# Patient Record
Sex: MALE | Race: WHITE | NOT HISPANIC OR LATINO | Employment: OTHER | URBAN - METROPOLITAN AREA
[De-identification: names, ages, dates, MRNs, and addresses within clinical notes are randomized per-mention and may not be internally consistent; named-entity substitution may affect disease eponyms.]

---

## 2019-02-18 ENCOUNTER — HISTORICAL (OUTPATIENT)
Dept: RADIOLOGY | Facility: HOSPITAL | Age: 74
End: 2019-02-18

## 2019-05-13 ENCOUNTER — HISTORICAL (OUTPATIENT)
Dept: LAB | Facility: HOSPITAL | Age: 74
End: 2019-05-13

## 2019-05-13 LAB
ABS NEUT (OLG): 4.31 X10(3)/MCL (ref 2.1–9.2)
ALBUMIN SERPL-MCNC: 4 GM/DL (ref 3.4–5)
ALBUMIN/GLOB SERPL: 1.5 {RATIO}
ALP SERPL-CCNC: 91 UNIT/L (ref 50–136)
ALT SERPL-CCNC: 37 UNIT/L (ref 12–78)
AST SERPL-CCNC: 24 UNIT/L (ref 15–37)
BASOPHILS # BLD AUTO: 0 X10(3)/MCL (ref 0–0.2)
BASOPHILS NFR BLD AUTO: 1 %
BILIRUB SERPL-MCNC: 0.6 MG/DL (ref 0.2–1)
BILIRUBIN DIRECT+TOT PNL SERPL-MCNC: 0.2 MG/DL (ref 0–0.2)
BILIRUBIN DIRECT+TOT PNL SERPL-MCNC: 0.4 MG/DL (ref 0–0.8)
BUN SERPL-MCNC: 18 MG/DL (ref 7–18)
CALCIUM SERPL-MCNC: 8.7 MG/DL (ref 8.5–10.1)
CHLORIDE SERPL-SCNC: 107 MMOL/L (ref 98–107)
CHOLEST SERPL-MCNC: 131 MG/DL (ref 0–200)
CHOLEST/HDLC SERPL: 4 {RATIO} (ref 0–5)
CO2 SERPL-SCNC: 28 MMOL/L (ref 21–32)
CREAT SERPL-MCNC: 1.28 MG/DL (ref 0.7–1.3)
EOSINOPHIL # BLD AUTO: 0.2 X10(3)/MCL (ref 0–0.9)
EOSINOPHIL NFR BLD AUTO: 2 %
ERYTHROCYTE [DISTWIDTH] IN BLOOD BY AUTOMATED COUNT: 12.8 % (ref 11.5–17)
EST. AVERAGE GLUCOSE BLD GHB EST-MCNC: 123 MG/DL
GLOBULIN SER-MCNC: 2.7 GM/DL (ref 2.4–3.5)
GLUCOSE SERPL-MCNC: 98 MG/DL (ref 74–106)
HBA1C MFR BLD: 5.9 % (ref 4.2–6.3)
HCT VFR BLD AUTO: 55.7 % (ref 42–52)
HDLC SERPL-MCNC: 33 MG/DL (ref 35–60)
HGB BLD-MCNC: 17.6 GM/DL (ref 14–18)
LDLC SERPL CALC-MCNC: 67 MG/DL (ref 0–129)
LYMPHOCYTES # BLD AUTO: 1.8 X10(3)/MCL (ref 0.6–4.6)
LYMPHOCYTES NFR BLD AUTO: 26 %
MCH RBC QN AUTO: 29.5 PG (ref 27–31)
MCHC RBC AUTO-ENTMCNC: 31.6 GM/DL (ref 33–36)
MCV RBC AUTO: 93.5 FL (ref 80–94)
MONOCYTES # BLD AUTO: 0.6 X10(3)/MCL (ref 0.1–1.3)
MONOCYTES NFR BLD AUTO: 8 %
NEUTROPHILS # BLD AUTO: 4.31 X10(3)/MCL (ref 2.1–9.2)
NEUTROPHILS NFR BLD AUTO: 62 %
PLATELET # BLD AUTO: 188 X10(3)/MCL (ref 130–400)
PMV BLD AUTO: 11.3 FL (ref 9.4–12.4)
POTASSIUM SERPL-SCNC: 4.5 MMOL/L (ref 3.5–5.1)
PROT SERPL-MCNC: 6.7 GM/DL (ref 6.4–8.2)
PSA SERPL-MCNC: 1.25 NG/ML (ref 0–4)
RBC # BLD AUTO: 5.96 X10(6)/MCL (ref 4.7–6.1)
SODIUM SERPL-SCNC: 140 MMOL/L (ref 136–145)
TRIGL SERPL-MCNC: 154 MG/DL (ref 30–150)
VIT B12 SERPL-MCNC: 502 PG/ML (ref 193–986)
VLDLC SERPL CALC-MCNC: 31 MG/DL
WBC # SPEC AUTO: 6.9 X10(3)/MCL (ref 4.5–11.5)

## 2020-03-03 ENCOUNTER — HISTORICAL (OUTPATIENT)
Dept: ADMINISTRATIVE | Facility: HOSPITAL | Age: 75
End: 2020-03-03

## 2022-03-23 ENCOUNTER — HISTORICAL (OUTPATIENT)
Dept: ENDOSCOPY | Facility: HOSPITAL | Age: 77
End: 2022-03-23

## 2022-04-10 ENCOUNTER — HISTORICAL (OUTPATIENT)
Dept: ADMINISTRATIVE | Facility: HOSPITAL | Age: 77
End: 2022-04-10
Payer: MEDICARE

## 2022-04-11 ENCOUNTER — HISTORICAL (OUTPATIENT)
Dept: ADMINISTRATIVE | Facility: HOSPITAL | Age: 77
End: 2022-04-11
Payer: MEDICARE

## 2022-04-26 ENCOUNTER — HISTORICAL (OUTPATIENT)
Dept: ADMINISTRATIVE | Facility: HOSPITAL | Age: 77
End: 2022-04-26
Payer: MEDICARE

## 2022-04-28 VITALS
DIASTOLIC BLOOD PRESSURE: 76 MMHG | OXYGEN SATURATION: 97 % | WEIGHT: 204.5 LBS | BODY MASS INDEX: 32.87 KG/M2 | HEIGHT: 66 IN | SYSTOLIC BLOOD PRESSURE: 130 MMHG

## 2022-04-28 VITALS
DIASTOLIC BLOOD PRESSURE: 76 MMHG | BODY MASS INDEX: 32.87 KG/M2 | SYSTOLIC BLOOD PRESSURE: 130 MMHG | WEIGHT: 204.5 LBS | HEIGHT: 66 IN | OXYGEN SATURATION: 97 %

## 2022-05-14 NOTE — H&P
Patient:   Landen Reyes             MRN: [id number]            FIN: [id number]               Age:   77 years     Sex:  Male     :  1945   Associated Diagnoses:   None   Author:   Raheem Estrada MD      CC: Melena    Subjective: 77 year old with history of CAD, gastric ulcer here for endorsement of melena.  Recently seen in clinic with these symptoms, although no issues since that time.  He has no other additional complaints.     Past Medical History:  CAD    Review of Systems:  as above    Physical Exam:  General appearance: alert, cooperative, no distress  HENT: Normocephalic, atraumatic, neck symmetrical, no nasal discharge   Lungs: clear to auscultation bilaterally, symmetric chest wall expansion bilaterally  Heart: regular rate and rhythm without rub; no displacement of the PMI   Abdomen: soft  Extremities: extremities symmetric; no clubbing, cyanosis, or edema  Neurologic: Alert and oriented X 3, normal strength, normal coordination and gait      Assessment:  Melena    Plan:  EGD

## 2022-05-16 ENCOUNTER — OFFICE VISIT (OUTPATIENT)
Dept: URGENT CARE | Facility: CLINIC | Age: 77
End: 2022-05-16
Payer: MEDICARE

## 2022-05-16 VITALS
BODY MASS INDEX: 33.11 KG/M2 | HEIGHT: 66 IN | OXYGEN SATURATION: 98 % | RESPIRATION RATE: 16 BRPM | SYSTOLIC BLOOD PRESSURE: 139 MMHG | HEART RATE: 73 BPM | TEMPERATURE: 98 F | DIASTOLIC BLOOD PRESSURE: 84 MMHG | WEIGHT: 206 LBS

## 2022-05-16 DIAGNOSIS — S61.219A FINGER LACERATION, INITIAL ENCOUNTER: Primary | ICD-10-CM

## 2022-05-16 PROCEDURE — 99213 OFFICE O/P EST LOW 20 MIN: CPT | Mod: ,,, | Performed by: FAMILY MEDICINE

## 2022-05-16 PROCEDURE — 99213 PR OFFICE/OUTPT VISIT, EST, LEVL III, 20-29 MIN: ICD-10-PCS | Mod: ,,, | Performed by: FAMILY MEDICINE

## 2022-05-16 RX ORDER — SERTRALINE HYDROCHLORIDE 25 MG/1
25 TABLET, FILM COATED ORAL DAILY
COMMUNITY
Start: 2022-05-16

## 2022-05-16 RX ORDER — TAMSULOSIN HYDROCHLORIDE 0.4 MG/1
1 CAPSULE ORAL DAILY
COMMUNITY
Start: 2022-05-09

## 2022-05-16 RX ORDER — NITROGLYCERIN 0.4 MG/1
0.4 TABLET SUBLINGUAL
COMMUNITY
Start: 2022-03-17

## 2022-05-16 RX ORDER — ATENOLOL 50 MG/1
25 TABLET ORAL 2 TIMES DAILY
COMMUNITY
Start: 2022-05-02 | End: 2023-10-17 | Stop reason: SDUPTHER

## 2022-05-16 RX ORDER — ROSUVASTATIN CALCIUM 40 MG/1
40 TABLET, COATED ORAL NIGHTLY
COMMUNITY
Start: 2022-03-30 | End: 2023-10-17

## 2022-05-16 RX ORDER — AMLODIPINE BESYLATE 5 MG/1
5 TABLET ORAL DAILY
COMMUNITY
Start: 2022-03-05 | End: 2023-10-17 | Stop reason: SDUPTHER

## 2022-05-16 RX ORDER — DIAZEPAM 10 MG/1
20 TABLET ORAL NIGHTLY
COMMUNITY
Start: 2022-03-14

## 2022-05-16 RX ORDER — FINASTERIDE 5 MG/1
5 TABLET, FILM COATED ORAL DAILY
COMMUNITY
Start: 2022-03-17

## 2022-05-16 RX ORDER — DOCUSATE SODIUM 100 MG/1
100 CAPSULE, LIQUID FILLED ORAL DAILY
COMMUNITY
Start: 2022-03-09

## 2022-05-16 RX ORDER — ZOLPIDEM TARTRATE 10 MG/1
1 TABLET ORAL
COMMUNITY
Start: 2022-04-07

## 2022-05-16 RX ORDER — BUSPIRONE HYDROCHLORIDE 15 MG/1
15 TABLET ORAL 2 TIMES DAILY
COMMUNITY
Start: 2021-11-23 | End: 2023-10-17

## 2022-05-16 NOTE — PROGRESS NOTES
"Subjective:       Patient ID: Landen Reyes is a 77 y.o. male.    Vitals:  height is 5' 6" (1.676 m) and weight is 93.4 kg (206 lb). His temperature is 97.9 °F (36.6 °C). His blood pressure is 139/84 and his pulse is 73. His respiration is 16 and oxygen saturation is 98%.     Chief Complaint: Laceration (Pt presents to clinic with laceration on the left index finger. Pt states he was opening dog's food with mauricio opener and cut the finger. Incident happened today.)    Laceration   The incident occurred 6 to 12 hours ago. The laceration is located on the left hand. The patient is experiencing no pain.       Constitution: Negative for appetite change, chills and sweating.   Respiratory: Negative for chest tightness and cough.    Skin: Positive for laceration. Negative for abscess.   Neurological: Negative for altered mental status.   Psychiatric/Behavioral: Negative for altered mental status.       Objective:      Physical Exam   Cardiovascular: Normal rate.   Abdominal: Normal appearance.   Neurological: He is alert.   Skin: Skin is warm.         Comments: L 3rd digit with superficial laceration of the palmar surface of the distal end, bleeding stopped         Assessment:       1. Finger laceration, initial encounter          Plan:         Finger laceration, initial encounter            Dermabond placed today and wrapped.       "

## 2022-06-02 ENCOUNTER — HOSPITAL ENCOUNTER (OUTPATIENT)
Facility: HOSPITAL | Age: 77
Discharge: HOME | End: 2022-06-02
Attending: INTERNAL MEDICINE | Admitting: INTERNAL MEDICINE
Payer: MEDICARE

## 2022-06-02 VITALS
DIASTOLIC BLOOD PRESSURE: 76 MMHG | SYSTOLIC BLOOD PRESSURE: 146 MMHG | TEMPERATURE: 98 F | OXYGEN SATURATION: 94 % | BODY MASS INDEX: 33.8 KG/M2 | RESPIRATION RATE: 20 BRPM | WEIGHT: 210.31 LBS | HEART RATE: 63 BPM | HEIGHT: 66 IN

## 2022-06-02 DIAGNOSIS — I25.10 CAD (CORONARY ARTERY DISEASE): ICD-10-CM

## 2022-06-02 DIAGNOSIS — Z98.61 CAD S/P PERCUTANEOUS CORONARY ANGIOPLASTY: ICD-10-CM

## 2022-06-02 DIAGNOSIS — I25.10 CORONARY ATHEROSCLEROSIS OF NATIVE CORONARY ARTERY: ICD-10-CM

## 2022-06-02 DIAGNOSIS — I25.10 CAD S/P PERCUTANEOUS CORONARY ANGIOPLASTY: ICD-10-CM

## 2022-06-02 LAB — CATH EF QUANTITATIVE: 65 %

## 2022-06-02 PROCEDURE — 93458 L HRT ARTERY/VENTRICLE ANGIO: CPT | Mod: 59 | Performed by: INTERNAL MEDICINE

## 2022-06-02 PROCEDURE — C1887 CATHETER, GUIDING: HCPCS | Performed by: INTERNAL MEDICINE

## 2022-06-02 PROCEDURE — 93005 ELECTROCARDIOGRAM TRACING: CPT | Mod: 59

## 2022-06-02 PROCEDURE — 92978 ENDOLUMINL IVUS OCT C 1ST: CPT | Performed by: INTERNAL MEDICINE

## 2022-06-02 PROCEDURE — 25000003 PHARM REV CODE 250: Performed by: INTERNAL MEDICINE

## 2022-06-02 PROCEDURE — C1753 CATH, INTRAVAS ULTRASOUND: HCPCS | Performed by: INTERNAL MEDICINE

## 2022-06-02 PROCEDURE — C9602 PERC D-E COR STENT ATHER S: HCPCS | Mod: LC | Performed by: INTERNAL MEDICINE

## 2022-06-02 PROCEDURE — C1874 STENT, COATED/COV W/DEL SYS: HCPCS | Performed by: INTERNAL MEDICINE

## 2022-06-02 PROCEDURE — 25500020 PHARM REV CODE 255: Performed by: INTERNAL MEDICINE

## 2022-06-02 PROCEDURE — 99153 MOD SED SAME PHYS/QHP EA: CPT | Performed by: INTERNAL MEDICINE

## 2022-06-02 PROCEDURE — 27201423 OPTIME MED/SURG SUP & DEVICES STERILE SUPPLY: Performed by: INTERNAL MEDICINE

## 2022-06-02 PROCEDURE — 99152 MOD SED SAME PHYS/QHP 5/>YRS: CPT | Performed by: INTERNAL MEDICINE

## 2022-06-02 PROCEDURE — C1769 GUIDE WIRE: HCPCS | Performed by: INTERNAL MEDICINE

## 2022-06-02 PROCEDURE — 63600175 PHARM REV CODE 636 W HCPCS: Performed by: INTERNAL MEDICINE

## 2022-06-02 PROCEDURE — C1894 INTRO/SHEATH, NON-LASER: HCPCS | Performed by: INTERNAL MEDICINE

## 2022-06-02 PROCEDURE — C1725 CATH, TRANSLUMIN NON-LASER: HCPCS | Performed by: INTERNAL MEDICINE

## 2022-06-02 DEVICE — STENT RONYX25022UX RESOLUTE ONYX 2.50X22
Type: IMPLANTABLE DEVICE | Site: HEART | Status: FUNCTIONAL
Brand: RESOLUTE ONYX™

## 2022-06-02 RX ORDER — MAG HYDROX/ALUMINUM HYD/SIMETH 200-200-20
SUSPENSION, ORAL (FINAL DOSE FORM) ORAL
Status: DISCONTINUED | OUTPATIENT
Start: 2022-06-02 | End: 2022-06-02 | Stop reason: HOSPADM

## 2022-06-02 RX ORDER — DULOXETIN HYDROCHLORIDE 30 MG/1
30 CAPSULE, DELAYED RELEASE ORAL 2 TIMES DAILY
COMMUNITY

## 2022-06-02 RX ORDER — VERAPAMIL HYDROCHLORIDE 2.5 MG/ML
INJECTION, SOLUTION INTRAVENOUS
Status: DISCONTINUED | OUTPATIENT
Start: 2022-06-02 | End: 2022-06-02 | Stop reason: HOSPADM

## 2022-06-02 RX ORDER — DIPHENHYDRAMINE HYDROCHLORIDE 50 MG/ML
INJECTION INTRAMUSCULAR; INTRAVENOUS
Status: DISCONTINUED | OUTPATIENT
Start: 2022-06-02 | End: 2022-06-02 | Stop reason: HOSPADM

## 2022-06-02 RX ORDER — HEPARIN SODIUM 1000 [USP'U]/ML
INJECTION, SOLUTION INTRAVENOUS; SUBCUTANEOUS
Status: DISCONTINUED | OUTPATIENT
Start: 2022-06-02 | End: 2022-06-02 | Stop reason: HOSPADM

## 2022-06-02 RX ORDER — MIDAZOLAM HYDROCHLORIDE 1 MG/ML
INJECTION, SOLUTION INTRAMUSCULAR; INTRAVENOUS
Status: DISCONTINUED | OUTPATIENT
Start: 2022-06-02 | End: 2022-06-02 | Stop reason: HOSPADM

## 2022-06-02 RX ORDER — FENTANYL CITRATE 50 UG/ML
INJECTION, SOLUTION INTRAMUSCULAR; INTRAVENOUS
Status: DISCONTINUED | OUTPATIENT
Start: 2022-06-02 | End: 2022-06-02 | Stop reason: HOSPADM

## 2022-06-02 RX ORDER — ASPIRIN 81 MG/1
81 TABLET ORAL DAILY
COMMUNITY

## 2022-06-02 RX ORDER — PILOCARPINE HYDROCHLORIDE 10 MG/ML
1 SOLUTION/ DROPS OPHTHALMIC 2 TIMES DAILY
COMMUNITY
Start: 2022-05-25

## 2022-06-02 RX ORDER — SODIUM CHLORIDE 9 MG/ML
INJECTION, SOLUTION INTRAVENOUS CONTINUOUS
Status: DISCONTINUED | OUTPATIENT
Start: 2022-06-02 | End: 2022-06-02 | Stop reason: HOSPADM

## 2022-06-02 RX ORDER — SODIUM CHLORIDE 0.9 % (FLUSH) 0.9 %
10 SYRINGE (ML) INJECTION
Status: ACTIVE | OUTPATIENT
Start: 2022-06-02

## 2022-06-02 RX ORDER — TRAVOPROST OPHTHALMIC SOLUTION 0.04 MG/ML
1 SOLUTION OPHTHALMIC NIGHTLY
COMMUNITY
Start: 2022-05-25

## 2022-06-02 RX ORDER — CLOPIDOGREL 300 MG/1
TABLET, FILM COATED ORAL
Status: DISCONTINUED | OUTPATIENT
Start: 2022-06-02 | End: 2022-06-02 | Stop reason: HOSPADM

## 2022-06-02 RX ORDER — SODIUM CHLORIDE 9 MG/ML
INJECTION, SOLUTION INTRAVENOUS ONCE
Status: COMPLETED | OUTPATIENT
Start: 2022-06-02 | End: 2022-06-02

## 2022-06-02 RX ORDER — DIAZEPAM 5 MG/1
10 TABLET ORAL
Status: COMPLETED | OUTPATIENT
Start: 2022-06-02 | End: 2022-06-02

## 2022-06-02 RX ORDER — DIPHENHYDRAMINE HCL 25 MG
50 CAPSULE ORAL
Status: COMPLETED | OUTPATIENT
Start: 2022-06-02 | End: 2022-06-02

## 2022-06-02 RX ORDER — LIDOCAINE HYDROCHLORIDE 20 MG/ML
INJECTION, SOLUTION INFILTRATION; PERINEURAL
Status: DISCONTINUED | OUTPATIENT
Start: 2022-06-02 | End: 2022-06-02 | Stop reason: HOSPADM

## 2022-06-02 RX ORDER — CLOPIDOGREL BISULFATE 75 MG/1
75 TABLET ORAL DAILY
Qty: 30 TABLET | Refills: 11 | Status: SHIPPED | OUTPATIENT
Start: 2022-06-02 | End: 2023-10-17 | Stop reason: ALTCHOICE

## 2022-06-02 RX ADMIN — DIPHENHYDRAMINE HYDROCHLORIDE 50 MG: 25 CAPSULE ORAL at 06:06

## 2022-06-02 RX ADMIN — DIAZEPAM 10 MG: 5 TABLET ORAL at 06:06

## 2022-06-02 RX ADMIN — SODIUM CHLORIDE: 9 INJECTION, SOLUTION INTRAVENOUS at 06:06

## 2022-06-02 NOTE — DISCHARGE INSTRUCTIONS
Informed to keep Rt armboard on x 24 hrs then remove. Information sheet given specific to Radial artery puncture sites - do not lift more than 5 lbs x 5 days.

## 2022-06-02 NOTE — Clinical Note
The catheter was inserted into the, was removed from the and was inserted over the wire into the ostial  left coronary artery. Hemodynamics were performed.  An angiography was performed of the left coronary arteries. Multiple views were taken.

## 2022-06-02 NOTE — DISCHARGE SUMMARY
[facility] - Cath Lab Services  Discharge Note  Short Stay    Procedure(s) (LRB):  CATHETERIZATION, HEART, LEFT (Left)    OUTCOME: Patient tolerated treatment/procedure well without complication and is now ready for discharge.    DISPOSITION: Home or Self Care    FINAL DIAGNOSIS:  <principal problem not specified>    FOLLOWUP: In clinic    DISCHARGE INSTRUCTIONS:  No discharge procedures on file.     TIME SPENT ON DISCHARGE: 120 minutes

## 2022-06-02 NOTE — Clinical Note
The catheter was inserted into the, was removed from the and was inserted over the wire into the left coronary artery. Hemodynamics were performed.  An angiography was performed of the left coronary arteries. Multiple views were taken.

## 2022-06-02 NOTE — Clinical Note
The catheter was inserted into the, was removed from the and was inserted over the wire into the ostium   right coronary artery. Hemodynamics were performed.  An angiography was performed Multiple views were taken.

## 2022-06-02 NOTE — INTERVAL H&P NOTE
Patient name: Landen Reyes  MRN: [id number]  : 1945  Cath Lab Procedure H&P Update    Pre-Procedure Assessment:    I saw and examined the patient face to face. The patient has been re-evaluated and his condition is unchanged. The reason for admission, procedure and care is still present.  Based on the patients H&P, pre-procedure physical exam, relevant diagnostic studies, NPO status and information obtained from the patient, I determine the patient is an appropriate candidate for the proposed procedure and anesthesia planned. I further certify the anesthesia risks, benefits and options have been explained to the patient to which he agrees as documented on the procedural consent.

## 2022-06-02 NOTE — Clinical Note
The catheter was inserted into the, was removed from the and was inserted over the wire into the left ventricle. Hemodynamics were performed.  and Pullback was recorded.  EF-60%

## 2022-06-02 NOTE — Clinical Note
The site was marked. The groin and right radial was prepped. The site was prepped with ChloraPrep. The site was clipped. The patient was draped.

## 2022-06-02 NOTE — Clinical Note
The catheter was inserted into the, was removed from the, was secured in place in the and was inserted over the wire into the ostial  left coronary artery. Hemodynamics were performed.  An angiography was performed of the left coronary arteries. Multiple views were taken. The angiography was performed via power injection.

## 2023-05-29 ENCOUNTER — HOSPITAL ENCOUNTER (EMERGENCY)
Facility: HOSPITAL | Age: 78
Discharge: HOME | End: 2023-05-29
Attending: STUDENT IN AN ORGANIZED HEALTH CARE EDUCATION/TRAINING PROGRAM
Payer: MEDICARE

## 2023-05-29 VITALS
HEIGHT: 66 IN | DIASTOLIC BLOOD PRESSURE: 81 MMHG | OXYGEN SATURATION: 97 % | HEART RATE: 59 BPM | BODY MASS INDEX: 34.12 KG/M2 | WEIGHT: 212.31 LBS | SYSTOLIC BLOOD PRESSURE: 153 MMHG | RESPIRATION RATE: 23 BRPM | TEMPERATURE: 97 F

## 2023-05-29 DIAGNOSIS — K59.00 CONSTIPATION: ICD-10-CM

## 2023-05-29 DIAGNOSIS — K62.5 RECTAL BLEEDING: Primary | ICD-10-CM

## 2023-05-29 LAB
ALBUMIN SERPL-MCNC: 4 G/DL (ref 3.4–4.8)
ALBUMIN/GLOB SERPL: 1.3 RATIO (ref 1.1–2)
ALP SERPL-CCNC: 96 UNIT/L (ref 40–150)
ALT SERPL-CCNC: 25 UNIT/L (ref 0–55)
AST SERPL-CCNC: 22 UNIT/L (ref 5–34)
BASOPHILS # BLD AUTO: 0.04 X10(3)/MCL
BASOPHILS NFR BLD AUTO: 0.7 %
BILIRUBIN DIRECT+TOT PNL SERPL-MCNC: 0.4 MG/DL
BUN SERPL-MCNC: 23.2 MG/DL (ref 8.4–25.7)
CALCIUM SERPL-MCNC: 9.7 MG/DL (ref 8.8–10)
CHLORIDE SERPL-SCNC: 108 MMOL/L (ref 98–107)
CO2 SERPL-SCNC: 25 MMOL/L (ref 23–31)
CREAT SERPL-MCNC: 1.19 MG/DL (ref 0.73–1.18)
EOSINOPHIL # BLD AUTO: 0.25 X10(3)/MCL (ref 0–0.9)
EOSINOPHIL NFR BLD AUTO: 4.3 %
ERYTHROCYTE [DISTWIDTH] IN BLOOD BY AUTOMATED COUNT: 12.5 % (ref 11.5–17)
GFR SERPLBLD CREATININE-BSD FMLA CKD-EPI: >60 MLS/MIN/1.73/M2
GLOBULIN SER-MCNC: 3.2 GM/DL (ref 2.4–3.5)
GLUCOSE SERPL-MCNC: 97 MG/DL (ref 82–115)
GROUP & RH: NORMAL
HCT VFR BLD AUTO: 49.1 % (ref 42–52)
HGB BLD-MCNC: 16.4 G/DL (ref 14–18)
IMM GRANULOCYTES # BLD AUTO: 0.03 X10(3)/MCL (ref 0–0.04)
IMM GRANULOCYTES NFR BLD AUTO: 0.5 %
INDIRECT COOMBS GEL: NORMAL
LYMPHOCYTES # BLD AUTO: 1.62 X10(3)/MCL (ref 0.6–4.6)
LYMPHOCYTES NFR BLD AUTO: 27.6 %
MCH RBC QN AUTO: 30 PG (ref 27–31)
MCHC RBC AUTO-ENTMCNC: 33.4 G/DL (ref 33–36)
MCV RBC AUTO: 89.9 FL (ref 80–94)
MONOCYTES # BLD AUTO: 0.58 X10(3)/MCL (ref 0.1–1.3)
MONOCYTES NFR BLD AUTO: 9.9 %
NEUTROPHILS # BLD AUTO: 3.35 X10(3)/MCL (ref 2.1–9.2)
NEUTROPHILS NFR BLD AUTO: 57 %
NRBC BLD AUTO-RTO: 0 %
PLATELET # BLD AUTO: 237 X10(3)/MCL (ref 130–400)
PMV BLD AUTO: 10.5 FL (ref 7.4–10.4)
POTASSIUM SERPL-SCNC: 4.6 MMOL/L (ref 3.5–5.1)
PROT SERPL-MCNC: 7.2 GM/DL (ref 5.8–7.6)
RBC # BLD AUTO: 5.46 X10(6)/MCL (ref 4.7–6.1)
SODIUM SERPL-SCNC: 139 MMOL/L (ref 136–145)
SPECIMEN OUTDATE: NORMAL
WBC # SPEC AUTO: 5.87 X10(3)/MCL (ref 4.5–11.5)

## 2023-05-29 PROCEDURE — 80053 COMPREHEN METABOLIC PANEL: CPT | Performed by: STUDENT IN AN ORGANIZED HEALTH CARE EDUCATION/TRAINING PROGRAM

## 2023-05-29 PROCEDURE — 99284 EMERGENCY DEPT VISIT MOD MDM: CPT

## 2023-05-29 PROCEDURE — 86900 BLOOD TYPING SEROLOGIC ABO: CPT | Performed by: STUDENT IN AN ORGANIZED HEALTH CARE EDUCATION/TRAINING PROGRAM

## 2023-05-29 PROCEDURE — 85610 PROTHROMBIN TIME: CPT | Performed by: STUDENT IN AN ORGANIZED HEALTH CARE EDUCATION/TRAINING PROGRAM

## 2023-05-29 PROCEDURE — 85025 COMPLETE CBC W/AUTO DIFF WBC: CPT | Performed by: STUDENT IN AN ORGANIZED HEALTH CARE EDUCATION/TRAINING PROGRAM

## 2023-05-29 RX ORDER — POLYETHYLENE GLYCOL 3350 17 G/17G
17 POWDER, FOR SOLUTION ORAL 2 TIMES DAILY
Qty: 30 EACH | Refills: 0 | Status: SHIPPED | OUTPATIENT
Start: 2023-05-29 | End: 2023-08-10

## 2023-05-29 NOTE — ED PROVIDER NOTES
Encounter Date: 5/29/2023       History     Chief Complaint   Patient presents with    Rectal Bleeding     Blood in stool this am.  No abd pain at this time.       78-year-old male presents to ED for constipation and rectal bleeding.  States he has had constipation for months.  Reports he has to strain to stool.  Last full bowel movement was 4 days ago but since he is had smaller bowel movements.  Denies any abdominal pain or distention.  States earlier today he did not have a bowel movement however when he wiped there was a small amount of blood.  No significant history of rectal bleeding.  Takes Plavix compliantly.  No rectal bleeding since.  No nausea or vomiting, no other complaints or concerns at this time.  Adult male in the room with patient.    Review of patient's allergies indicates:   Allergen Reactions    Rosuvastatin Other (See Comments)     myalagias    Sulfur Other (See Comments)     Headaches     Past Medical History:   Diagnosis Date    Angina pectoris     Anxiety disorder, unspecified     Blockage of coronary artery of heart     Depression     Hyperlipidemia     Hypertension     Unspecified glaucoma      Past Surgical History:   Procedure Laterality Date    COLONOSCOPY W/ POLYPECTOMY      heart stents      Circumflex, LAD, RCA    LEFT HEART CATHETERIZATION Left 6/2/2022    Procedure: CATHETERIZATION, HEART, LEFT;  Surgeon: Paulie Martin MD;  Location: [facility] CATH LAB;  Service: Cardiology;  Laterality: Left;  [facility] VIA RADIAL ACCESS     Family History   Family history unknown: Yes     Social History     Tobacco Use    Smoking status: Former    Smokeless tobacco: Former   Substance Use Topics    Alcohol use: Never     Comment: ocassionally    Drug use: Never     Review of Systems   Constitutional:  Negative for chills and fever.   HENT:  Negative for congestion, rhinorrhea and sore throat.    Eyes:  Negative for pain, discharge and itching.   Respiratory:  Negative for chest  tightness and shortness of breath.    Cardiovascular:  Negative for chest pain and palpitations.   Gastrointestinal:  Positive for blood in stool and constipation. Negative for abdominal distention, abdominal pain, anal bleeding, diarrhea, nausea, rectal pain and vomiting.   Genitourinary:  Negative for dysuria and hematuria.   Musculoskeletal:  Negative for myalgias and neck pain.   Skin:  Negative for color change and rash.   Neurological:  Negative for dizziness, weakness and headaches.   Psychiatric/Behavioral:  Negative for confusion. The patient is not hyperactive.      Physical Exam     Initial Vitals [05/29/23 1158]   BP Pulse Resp Temp SpO2   (!) 172/71 72 18 97.4 °F (36.3 °C) 97 %      MAP       --         Physical Exam    Constitutional: He appears well-developed and well-nourished. He is not diaphoretic. No distress.   HENT:   Head: Normocephalic and atraumatic.   Eyes: Conjunctivae and EOM are normal. Pupils are equal, round, and reactive to light.   Neck: Neck supple. No tracheal deviation present.   Normal range of motion.  Cardiovascular:  Normal rate, regular rhythm and normal heart sounds.           Pulmonary/Chest: Breath sounds normal. No respiratory distress.   Abdominal: Abdomen is soft. There is no abdominal tenderness. There is no rebound.   Genitourinary:    Genitourinary Comments: External rectal exam unremarkable.  No active bleeding or hemorrhoids.  Surrounding tissue appears mildly irritated without fissure.  Nonpainful.     Musculoskeletal:         General: No tenderness. Normal range of motion.      Cervical back: Normal range of motion and neck supple.     Neurological: He is alert and oriented to person, place, and time. He has normal strength. GCS score is 15. GCS eye subscore is 4. GCS verbal subscore is 5. GCS motor subscore is 6.   Skin: Skin is warm and dry. Capillary refill takes less than 2 seconds. No rash noted.   Psychiatric: He has a normal mood and affect. His behavior is  normal. Judgment and thought content normal.       ED Course   Procedures  Labs Reviewed   COMPREHENSIVE METABOLIC PANEL - Abnormal; Notable for the following components:       Result Value    Chloride 108 (*)     Creatinine 1.19 (*)     All other components within normal limits   CBC WITH DIFFERENTIAL - Abnormal; Notable for the following components:    MPV 10.5 (*)     All other components within normal limits   CBC W/ AUTO DIFFERENTIAL    Narrative:     The following orders were created for panel order CBC auto differential.  Procedure                               Abnormality         Status                     ---------                               -----------         ------                     CBC with Differential[[id number]]        Abnormal            Final result                 Please view results for these tests on the individual orders.   PROTIME-INR   EXTRA TUBES    Narrative:     The following orders were created for panel order EXTRA TUBES.  Procedure                               Abnormality         Status                     ---------                               -----------         ------                     Gold Top Hold[[id number]]                                    In process                   Please view results for these tests on the individual orders.   GOLD TOP HOLD   TYPE & SCREEN          Imaging Results              X-Ray Abdomen Flat And Erect (Final result)  Result time 05/29/23 13:46:59      Final result by Ced Clemons MD (05/29/23 13:46:59)                   Impression:      Nonobstructive bowel gas pattern.  Stool scattered throughout the colon as may be seen with constipation.      Electronically signed by: Ced Clemons  Date:    05/29/2023  Time:    13:46               Narrative:    EXAMINATION:  XR ABDOMEN FLAT AND ERECT    CLINICAL HISTORY:  Constipation, unspecified    TECHNIQUE:  Flat and upright imaging of the abdomen    COMPARISON:  None    FINDINGS:  No acute osseous  abnormality.  Nonobstructive bowel gas pattern.  Stool scattered throughout the colon.                                       Medications - No data to display  Medical Decision Making:   History:   Old Medical Records: I decided to obtain old medical records.  Initial Assessment:   78-year-old male presents to ED for worsening constipation and streak of blood in stool  Differential Diagnosis:   GI bleed  Hemorrhoids  Constipation  Diverticulosis  Diverticulitis  IBD  Gastritis  Gastric versus duodenal ulcers  Clinical Tests:   Lab Tests: Reviewed and Ordered  Radiological Study: Ordered and Reviewed  ED Management:  Patient in department hemodynamically stable.  Not tachycardic, hypotensive or uncomfortable suggestive of internal bleeding.  Rectal exam grossly benign.  Exam nonpainful, noninfectious, no obvious hemorrhoids.  X-ray demonstrates stool consistent with constipation and labs show normal hemoglobin and hematocrit, no evidence of infection, etc.  Will prescribe constipation medication and given dietary recommendations.  Is to follow up closely in the outpatient setting.  No evidence of gross rectal or GI bleed.  Very strict return precautions provided.  Ultimately stable for discharge and released. (Ana)                        Clinical Impression:   Final diagnoses:  [K59.00] Constipation  [K62.5] Rectal bleeding (Primary)        ED Disposition Condition    Discharge Stable          ED Prescriptions       Medication Sig Dispense Start Date End Date Auth. Provider    polyethylene glycol (GLYCOLAX) 17 gram PwPk Take 17 g by mouth 2 (two) times daily. 30 each 5/29/2023 -- Krishna Perez MD          Follow-up Information       Follow up With Specialties Details Why Contact Info    Mitchel Espana MD Family Medicine Schedule an appointment as soon as possible for a visit in 3 days  [address]  [facility] [address]  [phone]      [facility] Dept Emergency Medicine  As  needed, If symptoms worsen [address] [facility] [address]  [phone]             Krishna Perez MD  06/08/23 0356

## 2023-08-10 ENCOUNTER — OFFICE VISIT (OUTPATIENT)
Dept: GASTROENTEROLOGY | Facility: CLINIC | Age: 78
End: 2023-08-10
Payer: MEDICARE

## 2023-08-10 VITALS
WEIGHT: 218 LBS | SYSTOLIC BLOOD PRESSURE: 122 MMHG | BODY MASS INDEX: 35.03 KG/M2 | OXYGEN SATURATION: 95 % | HEART RATE: 63 BPM | DIASTOLIC BLOOD PRESSURE: 74 MMHG | HEIGHT: 66 IN | TEMPERATURE: 98 F | RESPIRATION RATE: 16 BRPM

## 2023-08-10 DIAGNOSIS — K62.5 RECTAL BLEEDING: ICD-10-CM

## 2023-08-10 DIAGNOSIS — K59.04 CHRONIC IDIOPATHIC CONSTIPATION: Primary | ICD-10-CM

## 2023-08-10 PROCEDURE — 99204 PR OFFICE/OUTPT VISIT, NEW, LEVL IV, 45-59 MIN: ICD-10-PCS | Mod: S$PBB,,, | Performed by: NURSE PRACTITIONER

## 2023-08-10 PROCEDURE — 99215 OFFICE O/P EST HI 40 MIN: CPT | Mod: PBBFAC | Performed by: NURSE PRACTITIONER

## 2023-08-10 PROCEDURE — 99204 OFFICE O/P NEW MOD 45 MIN: CPT | Mod: S$PBB,,, | Performed by: NURSE PRACTITIONER

## 2023-08-10 RX ORDER — POLYETHYLENE GLYCOL 3350 17 G/17G
17 POWDER, FOR SOLUTION ORAL DAILY
Qty: 510 G | Refills: 5 | Status: SHIPPED | OUTPATIENT
Start: 2023-08-10

## 2023-08-10 RX ORDER — OXCARBAZEPINE 150 MG/1
150 TABLET, FILM COATED ORAL 2 TIMES DAILY
COMMUNITY
Start: 2023-07-17

## 2023-08-10 NOTE — ASSESSMENT & PLAN NOTE
ED visit May 29, 2023 for further evaluation of constipation and rectal bleeding.    Physical exam revealed unremarkable rectal exam without hemorrhoids or anal fissures noted and nontender with SHLOMO exam.    Laboratory results revealed creatinine 1.19 and otherwise unremarkable CMP and CBC.    Abdominal x-ray revealed findings consistent with constipation.    Recommend soluble fiber supplementation  Avoid straining or sitting on the toilet for long periods of time  Continue Glycolax 17 g 1-2 times daily  Deferred colonoscopy at this time and we will consider repeat colonoscopy with persistent rectal bleeding/drop in H/H  Requested last colonoscopy report from Dr. Carson for further review   ER precautions provided   Call with updates  Follow-up clinic visit with NP in 4 months

## 2023-08-10 NOTE — PROGRESS NOTES
Subjective:       Patient ID: Landen Reyes is a 78 y.o. male.    Chief Complaint: Constipation (PT states that constipation is better with the medication given to him in ER.) and Rectal Bleeding (PT claims to have scratches his self when wiping that caused the bleeding.  He has not seen blood since that episode.)    This 78-year-old  male with a history of colon polyps, anxiety, depression, hypertension, hyperlipidemia, glaucoma, and CAD s/p stent placement is referred for rectal bleeding.  He presents unaccompanied.  He presented to the ED May 29, 2023 for further evaluation of constipation and rectal bleeding.  He reported constipation for several months and had to strain in order to have a bowel movement.  His last bowel movement was 4 days prior.  He denied abdominal pain or distention.  He reported a small amount of blood with wiping after defecation.  Upon arrival to ED, blood pressure was elevated and vital signs were otherwise stable.  Physical exam revealed unremarkable rectal exam without hemorrhoids or anal fissures noted and nontender with SHLOMO exam.  Laboratory results revealed creatinine 1.19 and otherwise unremarkable CMP and CBC.  Abdominal x-ray revealed findings consistent with constipation.  He was discharged home and prescribed Glycolax 17 g twice daily.  He was recommended outpatient follow-up with GI clinic.    Today, he presents for an initial visit.  He continues to take Glycolax on an as-needed basis and reports good relief when he does take the medication.  He typically has 1 formed stool every 1-2 days and feels completely evacuated most of the time.  He has not had any further bleeding since his ED visit.  He denies melena, hematochezia, fecal urgency, fecal incontinence, or pain with defecation.  He associates his bleeding at time of ED visit with scratching himself while wiping after defecation.  He denies abdominal pain.  His appetite is good and his weight is stable.  He  denies fever, chills, nausea, vomiting, hematemesis, odynophagia, dysphagia, acid reflux, pyrosis, or early satiety.  He is not interested in having another colonoscopy at this time.    He had a colonoscopy done 5 years ago with Dr. Carson with findings of benign colon polyps. He takes aspirin 81 mg daily. He denies tobacco or alcohol use. He denies illicit drug use. He denies a family history of IBD, colon polyps, or colon cancer.      Review of patient's allergies indicates:   Allergen Reactions    Rosuvastatin Other (See Comments)     myalagias    Sulfur Other (See Comments)     Headaches     Past Medical History:   Diagnosis Date    Angina pectoris     Anxiety disorder, unspecified     Blockage of coronary artery of heart     Depression     Hyperlipidemia     Hypertension     Unspecified glaucoma      Past Surgical History:   Procedure Laterality Date    COLONOSCOPY W/ POLYPECTOMY      heart stents      Circumflex, LAD, RCA    LEFT HEART CATHETERIZATION Left 6/2/2022    Procedure: CATHETERIZATION, HEART, LEFT;  Surgeon: Paulie Martin MD;  Location: [facility] CATH LAB;  Service: Cardiology;  Laterality: Left;  [facility] VIA RADIAL ACCESS     Family History:   Family history is unknown by patient.    Social History:    reports that he has quit smoking. He has quit using smokeless tobacco. He reports that he does not drink alcohol and does not use drugs.    Review of Systems  Negative except as noted in the HPI.      Objective:      Physical Exam  Constitutional:       Appearance: Normal appearance.   HENT:      Head: Normocephalic.      Mouth/Throat:      Mouth: Mucous membranes are moist.   Eyes:      Extraocular Movements: Extraocular movements intact.      Conjunctiva/sclera: Conjunctivae normal.      Pupils: Pupils are equal, round, and reactive to light.   Cardiovascular:      Rate and Rhythm: Normal rate and regular rhythm.      Pulses: Normal pulses.      Heart sounds: Normal heart sounds.   Pulmonary:       Effort: Pulmonary effort is normal.      Breath sounds: Normal breath sounds.   Abdominal:      General: Bowel sounds are normal.      Palpations: Abdomen is soft.   Musculoskeletal:         General: Normal range of motion.      Cervical back: Normal range of motion and neck supple.   Skin:     General: Skin is warm and dry.   Neurological:      General: No focal deficit present.      Mental Status: He is alert and oriented to person, place, and time.   Psychiatric:         Mood and Affect: Mood normal.         Behavior: Behavior normal.         Thought Content: Thought content normal.         Judgment: Judgment normal.         Home Medications:     Current Outpatient Medications   Medication Sig    amLODIPine (NORVASC) 5 MG tablet Take 5 mg by mouth once daily.    aspirin (ECOTRIN) 81 MG EC tablet Take 81 mg by mouth once daily.    atenoloL (TENORMIN) 50 MG tablet Take 25 mg by mouth 2 (two) times daily.    busPIRone (BUSPAR) 15 MG tablet Take 15 mg by mouth 2 (two) times daily.    diazePAM (VALIUM) 10 MG Tab Take 20 mg by mouth nightly.    docusate sodium (COLACE) 100 MG capsule Take 100 mg by mouth Daily.    DULoxetine (CYMBALTA) 30 MG capsule Take 30 mg by mouth 2 (two) times daily.    finasteride (PROSCAR) 5 mg tablet Take 5 mg by mouth once daily.    nitroGLYCERIN (NITROSTAT) 0.4 MG SL tablet Take 0.4 mg by mouth as needed.    pilocarpine HCL 1% (PILOCAR) 1 % ophthalmic solution Place 1 drop into the right eye 2 (two) times daily.    polyethylene glycol (GLYCOLAX) 17 gram PwPk Take 17 g by mouth 2 (two) times daily.    tamsulosin (FLOMAX) 0.4 mg Cap Take 1 capsule by mouth once daily.    travoprost (TRAVATAN Z) 0.004 % ophthalmic solution Place 1 drop into both eyes nightly.    clopidogreL (PLAVIX) 75 mg tablet Take 1 tablet (75 mg total) by mouth once daily. (Patient not taking: Reported on 8/10/2023)    OXcarbazepine (TRILEPTAL) 150 MG Tab Take 150 mg by mouth 2 (two) times daily.    rosuvastatin (CRESTOR)  40 MG Tab Take 40 mg by mouth nightly. Not taking due to muscle cramping    sertraline (ZOLOFT) 25 MG tablet Take 25 mg by mouth Daily.    zolpidem (AMBIEN) 10 mg Tab Take 1 tablet by mouth as needed.     Facility-Administered Medications Ordered in Other Visits   Medication Frequency    sodium chloride 0.9% flush 10 mL PRN     Laboratory Results:     Recent Results (from the past 2016 hour(s))   Protime-INR    Collection Time: 05/29/23 12:15 PM   Result Value Ref Range    PT 12.1 seconds    INR 0.91 0.00 - 1.30   Type & Screen    Collection Time: 05/29/23 12:15 PM   Result Value Ref Range    Group & Rh O POS     Indirect Ita GEL NEG     Specimen Outdate 06/01/2023 23:59    CBC with Differential    Collection Time: 05/29/23 12:15 PM   Result Value Ref Range    WBC 5.87 4.50 - 11.50 x10(3)/mcL    RBC 5.46 4.70 - 6.10 x10(6)/mcL    Hgb 16.4 14.0 - 18.0 g/dL    Hct 49.1 42.0 - 52.0 %    MCV 89.9 80.0 - 94.0 fL    MCH 30.0 27.0 - 31.0 pg    MCHC 33.4 33.0 - 36.0 g/dL    RDW 12.5 11.5 - 17.0 %    Platelet 237 130 - 400 x10(3)/mcL    MPV 10.5 (H) 7.4 - 10.4 fL    Neut % 57.0 %    Lymph % 27.6 %    Mono % 9.9 %    Eos % 4.3 %    Basophil % 0.7 %    Lymph # 1.62 0.6 - 4.6 x10(3)/mcL    Neut # 3.35 2.1 - 9.2 x10(3)/mcL    Mono # 0.58 0.1 - 1.3 x10(3)/mcL    Eos # 0.25 0 - 0.9 x10(3)/mcL    Baso # 0.04 <=0.2 x10(3)/mcL    IG# 0.03 0 - 0.04 x10(3)/mcL    IG% 0.5 %    NRBC% 0.0 %   Comprehensive metabolic panel    Collection Time: 05/29/23 12:16 PM   Result Value Ref Range    Sodium Level 139 136 - 145 mmol/L    Potassium Level 4.6 3.5 - 5.1 mmol/L    Chloride 108 (H) 98 - 107 mmol/L    Carbon Dioxide 25 23 - 31 mmol/L    Glucose Level 97 82 - 115 mg/dL    Blood Urea Nitrogen 23.2 8.4 - 25.7 mg/dL    Creatinine 1.19 (H) 0.73 - 1.18 mg/dL    Calcium Level Total 9.7 8.8 - 10.0 mg/dL    Protein Total 7.2 5.8 - 7.6 gm/dL    Albumin Level 4.0 3.4 - 4.8 g/dL    Globulin 3.2 2.4 - 3.5 gm/dL    Albumin/Globulin Ratio 1.3 1.1 - 2.0  ratio    Bilirubin Total 0.4 <=1.5 mg/dL    Alkaline Phosphatase 96 40 - 150 unit/L    Alanine Aminotransferase 25 0 - 55 unit/L    Aspartate Aminotransferase 22 5 - 34 unit/L    eGFR >60 mls/min/1.73/m2     Imaging Results:     Narrative & Impression  EXAMINATION:  XR ABDOMEN FLAT AND ERECT     CLINICAL HISTORY:  Constipation, unspecified     TECHNIQUE:  Flat and upright imaging of the abdomen     COMPARISON:  None     FINDINGS:  No acute osseous abnormality.  Nonobstructive bowel gas pattern.  Stool scattered throughout the colon.     Impression:     Nonobstructive bowel gas pattern.  Stool scattered throughout the colon as may be seen with constipation.      Electronically signed by: Ced Clemons  Date:                                            05/29/2023  Time:                                           13:46    Assessment/Plan:     Problem List Items Addressed This Visit          GI    Chronic idiopathic constipation - Primary     ED visit May 29, 2023 for further evaluation of constipation and rectal bleeding.    Physical exam revealed unremarkable rectal exam without hemorrhoids or anal fissures noted and nontender with SHLOMO exam.    Laboratory results revealed creatinine 1.19 and otherwise unremarkable CMP and CBC.    Abdominal x-ray revealed findings consistent with constipation.    Recommend soluble fiber supplementation  Avoid straining or sitting on the toilet for long periods of time  Continue Glycolax 17 g 1-2 times daily  Deferred colonoscopy at this time and we will consider repeat colonoscopy with persistent rectal bleeding/drop in H/H  Requested last colonoscopy report from Dr. Carson for further review   ER precautions provided   Call with updates  Follow-up clinic visit with NP in 4 months         Relevant Medications    polyethylene glycol (GLYCOLAX) 17 gram/dose powder    Rectal bleeding     See above         Relevant Medications    polyethylene glycol (GLYCOLAX) 17 gram/dose powder

## 2023-08-16 LAB
INR PPP: 0.9
PROTHROMBIN TIME: 12.1 SECONDS (ref 11.4–14)

## 2023-10-17 ENCOUNTER — OFFICE VISIT (OUTPATIENT)
Dept: CARDIOLOGY | Facility: CLINIC | Age: 78
End: 2023-10-17
Payer: MEDICARE

## 2023-10-17 VITALS
TEMPERATURE: 98 F | BODY MASS INDEX: 33.59 KG/M2 | OXYGEN SATURATION: 99 % | DIASTOLIC BLOOD PRESSURE: 84 MMHG | WEIGHT: 209 LBS | HEIGHT: 66 IN | HEART RATE: 66 BPM | SYSTOLIC BLOOD PRESSURE: 169 MMHG | RESPIRATION RATE: 18 BRPM

## 2023-10-17 DIAGNOSIS — I25.10 CORONARY ARTERY DISEASE, UNSPECIFIED VESSEL OR LESION TYPE, UNSPECIFIED WHETHER ANGINA PRESENT, UNSPECIFIED WHETHER NATIVE OR TRANSPLANTED HEART: ICD-10-CM

## 2023-10-17 DIAGNOSIS — E78.5 HYPERLIPIDEMIA, UNSPECIFIED HYPERLIPIDEMIA TYPE: Primary | ICD-10-CM

## 2023-10-17 DIAGNOSIS — I10 HYPERTENSION, UNSPECIFIED TYPE: ICD-10-CM

## 2023-10-17 DIAGNOSIS — Z78.9 POOR HISTORIAN: ICD-10-CM

## 2023-10-17 PROCEDURE — 99215 OFFICE O/P EST HI 40 MIN: CPT | Mod: PBBFAC

## 2023-10-17 RX ORDER — ATENOLOL 50 MG/1
25 TABLET ORAL 2 TIMES DAILY
Qty: 30 TABLET | Refills: 11 | Status: SHIPPED | OUTPATIENT
Start: 2023-10-17 | End: 2023-12-07 | Stop reason: SDUPTHER

## 2023-10-17 RX ORDER — EVOLOCUMAB 140 MG/ML
INJECTION, SOLUTION SUBCUTANEOUS
COMMUNITY
Start: 2023-07-04 | End: 2023-10-17

## 2023-10-17 RX ORDER — AMLODIPINE BESYLATE 5 MG/1
5 TABLET ORAL DAILY
Qty: 30 TABLET | Refills: 11 | Status: SHIPPED | OUTPATIENT
Start: 2023-10-17 | End: 2023-11-06

## 2023-10-17 NOTE — PROGRESS NOTES
CHIEF COMPLAINT:   Chief Complaint   Patient presents with    Follow-up     Abnormal ekg anxiety                                                  HPI:  Landen Reyes 78 y.o. male past medical history of CAD s/p PHILIP LCx, LAD and RCA, CVD- TONA 1-39% LICA 40-59%- 2022 (total 7 stents), HTN, HLD, carotid stenosis, chronic tinnitus with dizziness presents to [facility] cardiology clinic today to establish care.  Patient is former patient of Dr. Martin at [facility].  Patient is poor historian. Today he denies any cardiac complaints of chest pain, shortness of breath, palpitations, lightheadedness, dizziness, syncope, PND, or orthopnea.  He states that he does occasionally have mild chest pain that occurs at random, however he denies any exertional symptoms.  He states that he is able to complete his ADLs without any issues or ischemic symptoms.  States that he is not as physically active as he previously was, but states that he is going to try and get back into exercising more.  He states that he has a little dog who he takes care of and he is able to do so without any trouble.  He states that since he is gotten older he has found it more difficult to get out due to having less friends, but states that he still tries to meet with friends and family when he is able to.  Per chart review from old [facility] notes, patient is known to be intolerant to statins and is not able to afford Repatha.  He denies any tobacco or alcohol use.                                                                                                                                                                                                                                                                                                                                                                                                                                                                                      CARDIAC TESTING:    CV US Carotid BL 7.6.23  1.  The study quality is average.   2. 1-39% stenosis in the proximal right internal carotid artery based on Bluth Criteria.   3. 40-59% stenosis in the proximal left internal carotid artery based on Bluth Criteria.   4. Antegrade right vertebral artery flow.   5. Antegrade left vertebral artery flow.     Cardiac catheterization 6.2.22  · The Mid Cx stent with distal 80-90% stenosis extending into the distal circumflex and prior to the large terminal OM treated with laser atherectomy and IVUS guided PHILIP.  · The small bifurcating diagonal demonstrated an 80% stenosis of the proximal 3rd segment.  · The diffuse mid circumflex 60-70% stenosis with FLOR 3 flow throughout.  · The ejection fraction was calculated to be 65% with EDP of 10 mmHG.  · Aggrastat single bolus and Plavix 600 mg load given in cath lab.  · Right radial access  · A total of 230 cc contrast delivered    Trans Thoracic Echocardiogram 05/13/2022  1.The study quality is average.  2.The left ventricle is normal in size. Global left ventricular systolic function is normal. The left ventricular ejection fraction is 55%. There is evidence of septal bounce. Left ventricular diastolic function is normal. Normal wall thickness.  3.No significant valvular pathology noted.    Carotid Ultrasound 05/13/2022  1.The study quality is average.  2.1-39% stenosis in the proximal right internal carotid artery based on Bluth Criteria.  3.40-59% stenosis in the proximal left internal carotid artery based on Bluth Criteria. However, the ICA/CCA ratio is 1.8 which is consistent with 60-79% stenosis.  4.Antegrade right vertebral artery flow. Antegrade left vertebral artery flow.    Nuclear PET 08/12/2021  1.Stress EKG is non-diagnostic.  2.The heart rate recovery is normal.  1.This is an abnormal perfusion study. There is no evidence of ischemia. Small and mild fixed distal inferior defect.  2.This scan is suggestive of low risk for future cardiovascular events.  3.Small fixed  perfusion abnormality of mild intensity in the apical inferior segment.  4.The left ventricular cavity is noted to be normal on the stress studies. The stress left ventricular ejection fraction was calculated to be 58% and left ventricular global function is normal. The rest left ventricular cavity is noted to be normal. The rest left ventricular ejection fraction was calculated to be 43% and rest left ventricular global function is mildly reduced.  5.When compared to the resting ejection fraction (43%), the stress ejection fraction (58%) has increased.  6.The study quality is average.    Patient Active Problem List   Diagnosis    Chronic idiopathic constipation    Rectal bleeding     Past Surgical History:   Procedure Laterality Date    COLONOSCOPY W/ POLYPECTOMY      heart stents      Circumflex, LAD, RCA    LEFT HEART CATHETERIZATION Left 6/2/2022    Procedure: CATHETERIZATION, HEART, LEFT;  Surgeon: Paulie Martin MD;  Location: [facility] CATH LAB;  Service: Cardiology;  Laterality: Left;  [facility] VIA RADIAL ACCESS     Social History     Socioeconomic History    Marital status: Single   Occupational History    Occupation: Retired   Tobacco Use    Smoking status: Former    Smokeless tobacco: Former   Substance and Sexual Activity    Alcohol use: Never     Comment: ocassionally    Drug use: Never        Family History   Family history unknown: Yes     Review of patient's allergies indicates:   Allergen Reactions    Rosuvastatin Other (See Comments)     myalagias    Sulfur Other (See Comments)     Headaches         ROS:  Review of Systems   Constitutional: Negative.    HENT:  Positive for tinnitus.    Eyes: Negative.    Respiratory: Negative.  Negative for shortness of breath.    Cardiovascular: Negative.  Negative for chest pain (Occasional, mild), palpitations (Occasional, mild), orthopnea, claudication, leg swelling and PND.   Gastrointestinal: Negative.    Genitourinary: Negative.    Musculoskeletal: Negative.   "  Skin: Negative.    Neurological:  Positive for dizziness.   Endo/Heme/Allergies: Negative.    Psychiatric/Behavioral:  The patient is nervous/anxious.                                                                                                                                                                                 Negative except as stated in the history of present illness. See HPI for details.    PHYSICAL EXAM:  Visit Vitals  BP (!) 158/91 (BP Location: Right arm, Patient Position: Sitting, BP Method: Medium (Automatic))   Pulse 66   Temp 98.2 °F (36.8 °C)   Resp 18   Ht 5' 6.06" (1.678 m)   Wt 94.8 kg (208 lb 15.9 oz)   SpO2 99%   BMI 33.68 kg/m²       Physical Exam  Constitutional:       Appearance: Normal appearance.   HENT:      Head: Normocephalic.      Mouth/Throat:      Mouth: Mucous membranes are moist.   Eyes:      Extraocular Movements: Extraocular movements intact.      Pupils: Pupils are equal, round, and reactive to light.   Cardiovascular:      Rate and Rhythm: Normal rate and regular rhythm.      Pulses: Normal pulses.      Heart sounds: Normal heart sounds.   Pulmonary:      Effort: Pulmonary effort is normal. No respiratory distress.      Breath sounds: Normal breath sounds.   Abdominal:      General: There is distension.   Musculoskeletal:         General: Normal range of motion.      Right lower leg: No edema.      Left lower leg: No edema.   Skin:     General: Skin is warm and dry.   Neurological:      General: No focal deficit present.      Mental Status: He is alert and oriented to person, place, and time.   Psychiatric:         Mood and Affect: Mood normal.         Behavior: Behavior normal.         Current Outpatient Medications   Medication Instructions    amLODIPine (NORVASC) 5 mg, Oral, Daily    aspirin (ECOTRIN) 81 mg, Oral, Daily    atenoloL (TENORMIN) 25 mg, Oral, 2 times daily    clopidogreL (PLAVIX) 75 mg, Oral, Daily    diazePAM (VALIUM) 20 mg, Oral, Nightly    docusate " sodium (COLACE) 100 mg, Oral, Daily    DULoxetine (CYMBALTA) 30 mg, Oral, 2 times daily    finasteride (PROSCAR) 5 mg, Oral, Daily    nitroGLYCERIN (NITROSTAT) 0.4 mg, Oral, As needed (PRN)    OXcarbazepine (TRILEPTAL) 150 mg, Oral, 2 times daily    pilocarpine HCL 1% (PILOCAR) 1 % ophthalmic solution 1 drop, Right Eye, 2 times daily    polyethylene glycol (GLYCOLAX) 17 g, Oral, Daily    REPATHA SURECLICK 140 mg/mL PnIj SMARTSI unspecified SUB-Q Every 2 Weeks    sertraline (ZOLOFT) 25 mg, Oral, Daily    tamsulosin (FLOMAX) 0.4 mg Cap 1 capsule, Oral, Daily    travoprost (TRAVATAN Z) 0.004 % ophthalmic solution 1 drop, Both Eyes, Nightly    zolpidem (AMBIEN) 10 mg Tab 1 tablet, Oral, As needed (PRN)        All medications, laboratory studies, cardiac diagnostic imaging reviewed.     Lab Results   Component Value Date    LDL 67 2019    TRIG 154 (H) 2019    CREATININE 1.19 (H) 2023    K 4.6 2023        ASSESSMENT/PLAN:  CAD s/p PHILIP LCx, LAD and RCA, CVD- TONA 1-39% LICA 40-59%-  (total 7 stents)  - Multiple interventions and most recent PHILIP to circumflex on 2022.   - The patient denies any exertional symptoms today such as chest pain, shortness of breath, or palpitations   - Continue ASA therapy; previously on Plavix   - Patient unable to tolerate statin therapy and passed, unable to afford Repatha  - Will obtain baseline FLP to determine what medication to initiate- patient is a very poor historian and was confused regarding what medications he was taking  - Counseled on the importance of following low-sodium, low-cholesterol, heart healthy diet and exercise as tolerated  - Continue SL Nitro PRN for CP    Hyperlipidemia  - Multiple statin intolerances.   - LDL is above goal per last labs completed at [facility]  - Will have patient complete an updated FLP/CMP prior next office visit   - Patient states that he was unable to afford Repatha despite it being ordered multiple times   - States that  he is using his home regimen of garlic and garlic to lower his cholesterol  - Counseled on importance of following a low-cholesterol, heart healthy diet and exercise as tolerated    Hypertension  - BP above goal today; states that it is typically normotensive  - Patient is currently on atenolol and amlodipine  - Will have patient continue current medications for now   - Patient to return to cardiology clinic for nurse visit for BP check in 2-3 weeks.  Advised patient to check BP at home although he says that he will likely not do this  - Counseled on the importance of following a low-sodium, heart healthy diet and exercise as tolerated    Carotid Stenosis  - Most recent Carotid US completed July revealed 1-39% stenosis in the proximal right internal carotid artery and 40-59% stenosis in the proximal left internal carotid artery.   - Reports chronic lightheadedness and dizziness but associates this with tinnitus  - Denies any syncopal episodes    Poor Historian      Complete CMP/FLP prior next office visit   Follow up in cardiology clinic in 3 months or sooner if needed   Follow up with PCP as directed

## 2023-10-17 NOTE — PATIENT INSTRUCTIONS
Complete CMP/FLP prior next office visit   Follow up in cardiology clinic in 3 months or sooner if needed   Follow up with PCP as directed

## 2023-11-02 ENCOUNTER — LAB VISIT (OUTPATIENT)
Dept: LAB | Facility: HOSPITAL | Age: 78
End: 2023-11-02
Payer: MEDICARE

## 2023-11-02 DIAGNOSIS — E78.5 HYPERLIPIDEMIA, UNSPECIFIED HYPERLIPIDEMIA TYPE: ICD-10-CM

## 2023-11-02 LAB
ALBUMIN SERPL-MCNC: 4.1 G/DL (ref 3.4–4.8)
ALBUMIN/GLOB SERPL: 1.2 RATIO (ref 1.1–2)
ALP SERPL-CCNC: 93 UNIT/L (ref 40–150)
ALT SERPL-CCNC: 36 UNIT/L (ref 0–55)
AST SERPL-CCNC: 28 UNIT/L (ref 5–34)
BILIRUB SERPL-MCNC: 0.7 MG/DL
BUN SERPL-MCNC: 18.7 MG/DL (ref 8.4–25.7)
CALCIUM SERPL-MCNC: 9.7 MG/DL (ref 8.8–10)
CHLORIDE SERPL-SCNC: 105 MMOL/L (ref 98–107)
CHOLEST SERPL-MCNC: 172 MG/DL
CHOLEST/HDLC SERPL: 6 {RATIO} (ref 0–5)
CO2 SERPL-SCNC: 27 MMOL/L (ref 23–31)
CREAT SERPL-MCNC: 1.26 MG/DL (ref 0.73–1.18)
GFR SERPLBLD CREATININE-BSD FMLA CKD-EPI: 58 MLS/MIN/1.73/M2
GLOBULIN SER-MCNC: 3.3 GM/DL (ref 2.4–3.5)
GLUCOSE SERPL-MCNC: 104 MG/DL (ref 82–115)
HDLC SERPL-MCNC: 27 MG/DL (ref 35–60)
LDLC SERPL CALC-MCNC: 97 MG/DL (ref 50–140)
POTASSIUM SERPL-SCNC: 4.2 MMOL/L (ref 3.5–5.1)
PROT SERPL-MCNC: 7.4 GM/DL (ref 5.8–7.6)
SODIUM SERPL-SCNC: 140 MMOL/L (ref 136–145)
TRIGL SERPL-MCNC: 241 MG/DL (ref 34–140)
VLDLC SERPL CALC-MCNC: 48 MG/DL

## 2023-11-02 PROCEDURE — 80061 LIPID PANEL: CPT

## 2023-11-02 PROCEDURE — 80053 COMPREHEN METABOLIC PANEL: CPT

## 2023-11-02 PROCEDURE — 36415 COLL VENOUS BLD VENIPUNCTURE: CPT

## 2023-11-06 ENCOUNTER — OFFICE VISIT (OUTPATIENT)
Dept: CARDIOLOGY | Facility: CLINIC | Age: 78
End: 2023-11-06
Payer: MEDICARE

## 2023-11-06 VITALS
BODY MASS INDEX: 33.77 KG/M2 | HEIGHT: 66 IN | WEIGHT: 210.13 LBS | DIASTOLIC BLOOD PRESSURE: 77 MMHG | OXYGEN SATURATION: 97 % | SYSTOLIC BLOOD PRESSURE: 162 MMHG | TEMPERATURE: 98 F | RESPIRATION RATE: 18 BRPM | HEART RATE: 66 BPM

## 2023-11-06 DIAGNOSIS — I25.10 CAD S/P PERCUTANEOUS CORONARY ANGIOPLASTY: ICD-10-CM

## 2023-11-06 DIAGNOSIS — I10 PRIMARY HYPERTENSION: ICD-10-CM

## 2023-11-06 DIAGNOSIS — Z98.61 CAD S/P PERCUTANEOUS CORONARY ANGIOPLASTY: ICD-10-CM

## 2023-11-06 DIAGNOSIS — E78.2 MIXED HYPERLIPIDEMIA: ICD-10-CM

## 2023-11-06 DIAGNOSIS — I25.10 CORONARY ARTERY DISEASE, UNSPECIFIED VESSEL OR LESION TYPE, UNSPECIFIED WHETHER ANGINA PRESENT, UNSPECIFIED WHETHER NATIVE OR TRANSPLANTED HEART: Primary | ICD-10-CM

## 2023-11-06 DIAGNOSIS — R06.09 DOE (DYSPNEA ON EXERTION): ICD-10-CM

## 2023-11-06 PROCEDURE — 99215 OFFICE O/P EST HI 40 MIN: CPT | Mod: PBBFAC

## 2023-11-06 RX ORDER — AMLODIPINE BESYLATE 10 MG/1
10 TABLET ORAL DAILY
Qty: 30 TABLET | Refills: 11 | Status: SHIPPED | OUTPATIENT
Start: 2023-11-06 | End: 2023-12-07 | Stop reason: SDUPTHER

## 2023-11-06 RX ORDER — EZETIMIBE 10 MG/1
10 TABLET ORAL DAILY
Qty: 90 TABLET | Refills: 3 | Status: SHIPPED | OUTPATIENT
Start: 2023-11-06 | End: 2024-11-05

## 2023-11-06 NOTE — PATIENT INSTRUCTIONS
Complete nuclear stress test   Increase amlodipine to 10 mg daily   Start taking Zetia   Complete fasting lipid panel and CMP in 6 weeks   Follow up in general cardiology clinic in 3 months or sooner if needed   Follow up with PCP as directed   Strict ED precautions given

## 2023-11-06 NOTE — PROGRESS NOTES
CHIEF COMPLAINT:   Chief Complaint   Patient presents with    Follow-up    Dizziness     Dizziness w/ exertion sob concerned requesting [facility]                                                  HPI:  Landen Reyes 78 y.o. male past medical history of CAD s/p PHILIP LCx, LAD and RCA, CVD- TONA 1-39% LICA 40-59%- 2022 (total 7 stents), HTN, HLD, carotid stenosis, chronic tinnitus with dizziness presents to [facility] cardiology clinic for follow up and ongoing care.  Patient is former patient of Dr. Martin at [facility].  Patient is poor historian.  Patient was last seen as initial workup.  At that time he denied any cardiac complaints.  He denied any exertional symptoms.  He stated that he is able to complete his ADLs without any issues or ischemic symptoms.  He reported mild physical activity.    Today the patient reports worsening shortness of breath with exertion.  States that this has progressed since last office visit.  He reports shortness of breath with minimal exertion and doing his ADLs.  He also reports some degree of bendopnea and lightheadedness.  He continues to endorse occasional chest pain that is mild and occurs at random.  He states that he has had several stents in the past and he feels off.  Otherwise he denies any further cardiac complaints like palpitations, PND, orthopnea.  As previously stated, patient is a poor historian and compliance with medications is unclear.  He states that he is less physically active than he was last office visit. He denies any tobacco or illicit drug use.                                       Patient is requesting an angiogram today.  Advised patient that we would need to do some ischemic testing before we can proceed with an angiogram.  However did give patient's strict ED precautions.                                                                                                                                                                                                                                                                                                                                                                                                                                    CARDIAC TESTING:     CV US Carotid BL 7.6.23  1. The study quality is average.   2. 1-39% stenosis in the proximal right internal carotid artery based on Bluth Criteria.   3. 40-59% stenosis in the proximal left internal carotid artery based on Bluth Criteria.   4. Antegrade right vertebral artery flow.   5. Antegrade left vertebral artery flow.     Cardiac catheterization 6.2.22  · The Mid Cx stent with distal 80-90% stenosis extending into the distal circumflex and prior to the large terminal OM treated with laser atherectomy and IVUS guided PHILIP.  · The small bifurcating diagonal demonstrated an 80% stenosis of the proximal 3rd segment.  · The diffuse mid circumflex 60-70% stenosis with FLOR 3 flow throughout.  · The ejection fraction was calculated to be 65% with EDP of 10 mmHG.  · Aggrastat single bolus and Plavix 600 mg load given in cath lab.  · Right radial access  · A total of 230 cc contrast delivered    Trans Thoracic Echocardiogram 05/13/2022  1.The study quality is average.  2.The left ventricle is normal in size. Global left ventricular systolic function is normal. The left ventricular ejection fraction is 55%. There is evidence of septal bounce. Left ventricular diastolic function is normal. Normal wall thickness.  3.No significant valvular pathology noted.    Carotid Ultrasound 05/13/2022  1.The study quality is average.  2.1-39% stenosis in the proximal right internal carotid artery based on Bluth Criteria.  3.40-59% stenosis in the proximal left internal carotid artery based on Bluth Criteria. However, the ICA/CCA ratio is 1.8 which is consistent with 60-79% stenosis.  4.Antegrade right vertebral artery flow. Antegrade left vertebral artery flow.    Nuclear PET 08/12/2021  1.Stress EKG  is non-diagnostic.  2.The heart rate recovery is normal.  1.This is an abnormal perfusion study. There is no evidence of ischemia. Small and mild fixed distal inferior defect.  2.This scan is suggestive of low risk for future cardiovascular events.  3.Small fixed perfusion abnormality of mild intensity in the apical inferior segment.  4.The left ventricular cavity is noted to be normal on the stress studies. The stress left ventricular ejection fraction was calculated to be 58% and left ventricular global function is normal. The rest left ventricular cavity is noted to be normal. The rest left ventricular ejection fraction was calculated to be 43% and rest left ventricular global function is mildly reduced.  5.When compared to the resting ejection fraction (43%), the stress ejection fraction (58%) has increased.  6.The study quality is average.    Patient Active Problem List   Diagnosis    Chronic idiopathic constipation    Rectal bleeding    Hyperlipidemia    Hypertension    Coronary artery disease    Poor historian     Past Surgical History:   Procedure Laterality Date    COLONOSCOPY W/ POLYPECTOMY      heart stents      Circumflex, LAD, RCA    LEFT HEART CATHETERIZATION Left 6/2/2022    Procedure: CATHETERIZATION, HEART, LEFT;  Surgeon: Paulie Martin MD;  Location: [facility] CATH LAB;  Service: Cardiology;  Laterality: Left;  [facility] VIA RADIAL ACCESS     Social History     Socioeconomic History    Marital status: Single   Occupational History    Occupation: Retired   Tobacco Use    Smoking status: Former    Smokeless tobacco: Former   Substance and Sexual Activity    Alcohol use: Never     Comment: ocassionally    Drug use: Never        Family History   Family history unknown: Yes     Review of patient's allergies indicates:   Allergen Reactions    Rosuvastatin Other (See Comments)     myalagias    Sulfur Other (See Comments)     Headaches         ROS:  Review of Systems   Constitutional: Negative.    HENT:   "Positive for tinnitus.    Eyes: Negative.    Respiratory:  Positive for shortness of breath.    Cardiovascular: Negative.  Negative for chest pain (Occasional, mild), palpitations (Occasional, mild), orthopnea, claudication, leg swelling and PND.   Gastrointestinal: Negative.    Genitourinary: Negative.    Musculoskeletal: Negative.    Skin: Negative.    Neurological:  Positive for dizziness.   Endo/Heme/Allergies: Negative.    Psychiatric/Behavioral:  The patient is nervous/anxious.                                                                                                                                                                                 Negative except as stated in the history of present illness. See HPI for details.    PHYSICAL EXAM:  Visit Vitals  BP (!) 156/79 (BP Location: Right arm, Patient Position: Sitting, BP Method: Large (Automatic))   Pulse 66   Temp 97.9 °F (36.6 °C)   Resp 18   Ht 5' 6.04" (1.677 m)   Wt 95.3 kg (210 lb 1.6 oz)   SpO2 97%   BMI 33.87 kg/m²       Physical Exam  Constitutional:       Appearance: Normal appearance.   HENT:      Head: Normocephalic.      Mouth/Throat:      Mouth: Mucous membranes are moist.   Eyes:      Extraocular Movements: Extraocular movements intact.      Pupils: Pupils are equal, round, and reactive to light.   Cardiovascular:      Rate and Rhythm: Normal rate and regular rhythm.      Pulses: Normal pulses.      Heart sounds: Normal heart sounds.   Pulmonary:      Effort: Pulmonary effort is normal. No respiratory distress.      Breath sounds: Normal breath sounds.   Abdominal:      General: There is distension.   Musculoskeletal:         General: Normal range of motion.      Right lower leg: No edema.      Left lower leg: No edema.   Skin:     General: Skin is warm and dry.   Neurological:      General: No focal deficit present.      Mental Status: He is alert and oriented to person, place, and time.   Psychiatric:         Mood and Affect: Mood " normal.         Behavior: Behavior normal.         Current Outpatient Medications   Medication Instructions    amLODIPine (NORVASC) 5 mg, Oral, Daily    aspirin (ECOTRIN) 81 mg, Oral, Daily    atenoloL (TENORMIN) 25 mg, Oral, 2 times daily    diazePAM (VALIUM) 20 mg, Oral, Nightly    docusate sodium (COLACE) 100 mg, Oral, Daily    DULoxetine (CYMBALTA) 30 mg, Oral, 2 times daily    finasteride (PROSCAR) 5 mg, Oral, Daily    nitroGLYCERIN (NITROSTAT) 0.4 mg, Oral, As needed (PRN)    OXcarbazepine (TRILEPTAL) 150 mg, Oral, 2 times daily    pilocarpine HCL 1% (PILOCAR) 1 % ophthalmic solution 1 drop, Right Eye, 2 times daily    polyethylene glycol (GLYCOLAX) 17 g, Oral, Daily    sertraline (ZOLOFT) 25 mg, Oral, Daily    tamsulosin (FLOMAX) 0.4 mg Cap 1 capsule, Oral, Daily    travoprost (TRAVATAN Z) 0.004 % ophthalmic solution 1 drop, Both Eyes, Nightly    zolpidem (AMBIEN) 10 mg Tab 1 tablet, Oral, As needed (PRN)        All medications, laboratory studies, cardiac diagnostic imaging reviewed.     Lab Results   Component Value Date    LDL 97.00 11/02/2023    LDL 67 05/13/2019    TRIG 241 (H) 11/02/2023    TRIG 154 (H) 05/13/2019    CREATININE 1.26 (H) 11/02/2023    K 4.2 11/02/2023        ASSESSMENT/PLAN:  CAD s/p PHILIP LCx, LAD and RCA, CVD- TONA 1-39% LICA 40-59%- 2022 (total 7 stents)  - Multiple interventions and most recent PHILIP to circumflex on 6/2022.   - Today the patient endorses exertional shortness of breath which is progressed since last office visit.  He states that it occurs with minimal exertion.  He states that overall he is just feeling not like himself and he states that he is feeling worse overall.   - He is requesting a repeat LHC.  Advised patient that we would need testing for 4 proceeding with the procedure.  Testing is warranted as he is having ischemic symptoms.  Will have patient proceed with nuclear stress test.  Pending results a LHC may be indicated at that time.  - Continue ASA therapy;  previously on Plavix   - Patient unable to tolerate statin therapy and passed, unable to afford Repatha  - Will obtain baseline FLP to determine what medication to initiate- patient is a very poor historian and was confused regarding what medications he was taking  - Counseled on the importance of following low-sodium, low-cholesterol, heart healthy diet and exercise as tolerated  - Continue SL Nitro PRN for CP    Hyperlipidemia  - Multiple statin intolerances.   - LDL is above goal per labs November 2023.  LDL 97, triglycerides 241  - Will start patient on Zetia to see if it improves lipid panel.  Patient is unsure if he has taken in the past but he states that he does not believe he has.  Advised patient to stop medication and notify clinic if he has adverse effects  - Will have patient complete an updated FLP/CMP prior to next office visit   - Patient states that he was unable to afford Repatha despite it being ordered multiple times   - States that he is using his home regimen of garlic and garlic to lower his cholesterol  - Counseled on importance of following a low-cholesterol, heart healthy diet and exercise as tolerated    Hypertension  - BP above goal today on initial reading and repeat  - Patient is currently on atenolol and amlodipine  - Will increase amlodipine to 10 mg daily  - Patient to return to cardiology clinic for nurse visit for BP check in 2-3 weeks.  Advised patient to check BP at home although he says that he will likely not do this  - Counseled on the importance of following a low-sodium, heart healthy diet and exercise as tolerated    Carotid Stenosis  - Most recent Carotid US completed July revealed 1-39% stenosis in the proximal right internal carotid artery and 40-59% stenosis in the proximal left internal carotid artery.   - Reports chronic lightheadedness and dizziness but associates this with tinnitus  - Denies any syncopal episodes    Very Poor Historian*      Complete nuclear stress  test   Increase amlodipine to 10 mg daily   Start taking Zetia   Complete fasting lipid panel and CMP in 6 weeks   Follow up in general cardiology clinic in 3 months or sooner if needed   Follow up with PCP as directed   Strict ED precautions given

## 2023-11-13 PROBLEM — K62.5 RECTAL BLEEDING: Status: RESOLVED | Noted: 2023-08-10 | Resolved: 2023-11-13

## 2023-11-21 ENCOUNTER — HOSPITAL ENCOUNTER (OUTPATIENT)
Dept: RADIOLOGY | Facility: HOSPITAL | Age: 78
Discharge: HOME | End: 2023-11-21
Payer: MEDICARE

## 2023-11-21 ENCOUNTER — HOSPITAL ENCOUNTER (OUTPATIENT)
Dept: CARDIOLOGY | Facility: HOSPITAL | Age: 78
Discharge: HOME | End: 2023-11-21
Payer: MEDICARE

## 2023-11-21 VITALS
BODY MASS INDEX: 33.77 KG/M2 | RESPIRATION RATE: 20 BRPM | SYSTOLIC BLOOD PRESSURE: 152 MMHG | DIASTOLIC BLOOD PRESSURE: 66 MMHG | HEIGHT: 66 IN | WEIGHT: 210.13 LBS | HEART RATE: 62 BPM

## 2023-11-21 DIAGNOSIS — I25.10 CAD S/P PERCUTANEOUS CORONARY ANGIOPLASTY: ICD-10-CM

## 2023-11-21 DIAGNOSIS — R06.09 DOE (DYSPNEA ON EXERTION): ICD-10-CM

## 2023-11-21 DIAGNOSIS — I25.10 CORONARY ARTERY DISEASE, UNSPECIFIED VESSEL OR LESION TYPE, UNSPECIFIED WHETHER ANGINA PRESENT, UNSPECIFIED WHETHER NATIVE OR TRANSPLANTED HEART: ICD-10-CM

## 2023-11-21 DIAGNOSIS — Z98.61 CAD S/P PERCUTANEOUS CORONARY ANGIOPLASTY: ICD-10-CM

## 2023-11-21 LAB
CV STRESS BASE HR: 60 BPM
DIASTOLIC BLOOD PRESSURE: 66 MMHG
NUC REST EJECTION FRACTION: 57
NUC STRESS EJECTION FRACTION: 51 %
OHS CV CPX 85 PERCENT MAX PREDICTED HEART RATE MALE: 119
OHS CV CPX ESTIMATED METS: 2
OHS CV CPX MAX PREDICTED HEART RATE: 140
OHS CV CPX PATIENT IS FEMALE: 0
OHS CV CPX PATIENT IS MALE: 1
OHS CV CPX PEAK DIASTOLIC BLOOD PRESSURE: 66 MMHG
OHS CV CPX PEAK HEAR RATE: 96 BPM
OHS CV CPX PEAK RATE PRESSURE PRODUCT: NORMAL
OHS CV CPX PEAK SYSTOLIC BLOOD PRESSURE: 152 MMHG
OHS CV CPX PERCENT MAX PREDICTED HEART RATE ACHIEVED: 69
OHS CV CPX RATE PRESSURE PRODUCT PRESENTING: 9120
STRESS ECHO POST EXERCISE DUR MIN: 2 MINUTES
STRESS ECHO POST EXERCISE DUR SEC: 24 SECONDS
SYSTOLIC BLOOD PRESSURE: 152 MMHG

## 2023-11-21 PROCEDURE — 78452 HT MUSCLE IMAGE SPECT MULT: CPT

## 2023-11-21 PROCEDURE — 93017 CV STRESS TEST TRACING ONLY: CPT

## 2023-12-07 ENCOUNTER — OFFICE VISIT (OUTPATIENT)
Dept: CARDIOLOGY | Facility: CLINIC | Age: 78
End: 2023-12-07
Payer: MEDICARE

## 2023-12-07 VITALS
OXYGEN SATURATION: 96 % | SYSTOLIC BLOOD PRESSURE: 128 MMHG | BODY MASS INDEX: 33.4 KG/M2 | TEMPERATURE: 98 F | HEART RATE: 55 BPM | WEIGHT: 207.81 LBS | DIASTOLIC BLOOD PRESSURE: 73 MMHG | RESPIRATION RATE: 20 BRPM | HEIGHT: 66 IN

## 2023-12-07 DIAGNOSIS — Z98.61 CAD S/P PERCUTANEOUS CORONARY ANGIOPLASTY: Primary | ICD-10-CM

## 2023-12-07 DIAGNOSIS — E78.2 MIXED HYPERLIPIDEMIA: ICD-10-CM

## 2023-12-07 DIAGNOSIS — E66.09 CLASS 1 OBESITY DUE TO EXCESS CALORIES WITH BODY MASS INDEX (BMI) OF 33.0 TO 33.9 IN ADULT, UNSPECIFIED WHETHER SERIOUS COMORBIDITY PRESENT: ICD-10-CM

## 2023-12-07 DIAGNOSIS — I10 PRIMARY HYPERTENSION: ICD-10-CM

## 2023-12-07 DIAGNOSIS — I25.10 CAD S/P PERCUTANEOUS CORONARY ANGIOPLASTY: Primary | ICD-10-CM

## 2023-12-07 PROCEDURE — 99215 OFFICE O/P EST HI 40 MIN: CPT | Mod: PBBFAC | Performed by: INTERNAL MEDICINE

## 2023-12-07 RX ORDER — ROSUVASTATIN CALCIUM 20 MG/1
20 TABLET, COATED ORAL DAILY
Qty: 90 TABLET | Refills: 3 | Status: SHIPPED | OUTPATIENT
Start: 2023-12-07 | End: 2024-12-06

## 2023-12-07 RX ORDER — AMLODIPINE BESYLATE 10 MG/1
10 TABLET ORAL DAILY
Qty: 90 TABLET | Refills: 1 | Status: SHIPPED | OUTPATIENT
Start: 2023-12-07 | End: 2024-12-06

## 2023-12-07 RX ORDER — LISINOPRIL 10 MG/1
10 TABLET ORAL DAILY
Qty: 90 TABLET | Refills: 3 | Status: SHIPPED | OUTPATIENT
Start: 2023-12-07 | End: 2024-12-06

## 2023-12-07 RX ORDER — ATENOLOL 50 MG/1
25 TABLET ORAL 2 TIMES DAILY
Qty: 90 TABLET | Refills: 1 | Status: SHIPPED | OUTPATIENT
Start: 2023-12-07 | End: 2024-12-06

## 2023-12-07 RX ORDER — BUSPIRONE HYDROCHLORIDE 10 MG/1
10 TABLET ORAL 3 TIMES DAILY
COMMUNITY
Start: 2023-10-11

## 2023-12-07 NOTE — PROGRESS NOTES
12/07/2023 12:34 PM    Subjective:     CHIEF COMPLAINT:   Chief Complaint   Patient presents with    f/u denies chest pain has SOB when bending down no question                           HPI:    Mr. Landen Reyes is a 78 y.o. male with CAD s/p PCI (RCA, LAD, LCx), HTN, HLD, and bendopnea who presents to clinic for follow up.     He reports his blood pressure runs between 135-150/80s at home. He reports adherence with his blood pressure medication.     He reports he walks 1 mile daily his dog. He denies chest pain or pressure. He denies dyspnea on exertion. He denies neck, jaw, shoulder, or back pain. He denies diaphoresis with exertion. He denies lightheadedness, nausea, or emesis with exertion. He had a NMST mild to moderate intensity, small to moderate sized, reversible perfusion abnormality that is consistent with RCA. He is asymptomatic.     He denies symptoms of heart failure.     He reports statins give him abdominal cramp. He is not on a statin and he still gets the same cramps he reports. He is unsure if he has attempted a water soluble statin.     Past Medical History    Patient Active Problem List   Diagnosis    Chronic idiopathic constipation    Hyperlipidemia    Hypertension    Coronary artery disease    Poor historian    LIN (dyspnea on exertion)    CAD S/P percutaneous coronary angioplasty       Surgical History    Past Surgical History:   Procedure Laterality Date    COLONOSCOPY W/ POLYPECTOMY      heart stents      Circumflex, LAD, RCA    LEFT HEART CATHETERIZATION Left 6/2/2022    Procedure: CATHETERIZATION, HEART, LEFT;  Surgeon: Paulie Martin MD;  Location: [facility] CATH LAB;  Service: Cardiology;  Laterality: Left;  [facility] VIA RADIAL ACCESS       Social History     Socioeconomic History    Marital status: Single   Occupational History    Occupation: Retired   Tobacco Use    Smoking status: Former    Smokeless tobacco: Former   Substance and Sexual Activity    Alcohol use: Never     Comment:  ocassionally    Drug use: Never       Family History   Family history unknown: Yes     Review of patient's allergies indicates:   Allergen Reactions    Rosuvastatin Other (See Comments)     myalagias    Sulfur Other (See Comments)     Headaches       Current Medications    Current Outpatient Medications   Medication Instructions    amLODIPine (NORVASC) 10 mg, Oral, Daily    aspirin (ECOTRIN) 81 mg, Oral, Daily    atenoloL (TENORMIN) 25 mg, Oral, 2 times daily    busPIRone (BUSPAR) 10 mg, Oral, 3 times daily    diazePAM (VALIUM) 20 mg, Oral, Nightly    docusate sodium (COLACE) 100 mg, Oral, Daily    DULoxetine (CYMBALTA) 30 mg, Oral, 2 times daily    ezetimibe (ZETIA) 10 mg, Oral, Daily    finasteride (PROSCAR) 5 mg, Oral, Daily    nitroGLYCERIN (NITROSTAT) 0.4 mg, Oral, As needed (PRN)    OXcarbazepine (TRILEPTAL) 150 mg, Oral, 2 times daily    pilocarpine HCL 1% (PILOCAR) 1 % ophthalmic solution 1 drop, Right Eye, 2 times daily    polyethylene glycol (GLYCOLAX) 17 g, Oral, Daily    sertraline (ZOLOFT) 25 mg, Oral, Daily    tamsulosin (FLOMAX) 0.4 mg Cap 1 capsule, Oral, Daily    travoprost (TRAVATAN Z) 0.004 % ophthalmic solution 1 drop, Both Eyes, Nightly    zolpidem (AMBIEN) 10 mg Tab 1 tablet, Oral, As needed (PRN)         ROS:   Denies headaches, changes in vision, nausea, vomiting, fever, chills, chest pain, palpitations, dyspnea, abdominal pain, or change in urinary or bowel habits.    Objective:     BP Readings from Last 3 Encounters:   12/07/23 128/73   11/21/23 (!) 152/66   11/06/23 (!) 162/77        Pulse Readings from Last 3 Encounters:   12/07/23 (!) 55   11/21/23 62   11/06/23 66        Temp Readings from Last 3 Encounters:   12/07/23 97.7 °F (36.5 °C) (Oral)   11/06/23 97.9 °F (36.6 °C)   10/17/23 98.2 °F (36.8 °C)       Wt Readings from Last 3 Encounters:   12/07/23 94.3 kg (207 lb 12.8 oz)   11/21/23 95.3 kg (210 lb 1.6 oz)   11/06/23 95.3 kg (210 lb 1.6 oz)         PE:  Blood pressure 128/73, pulse  "(!) 55, temperature 97.7 °F (36.5 °C), temperature source Oral, resp. rate 20, height 5' 6" (1.676 m), weight 94.3 kg (207 lb 12.8 oz), SpO2 96 %.   Physical Exam  Vitals reviewed.   Constitutional:       General: He is not in acute distress.     Appearance: Normal appearance. He is obese. He is not ill-appearing.   HENT:      Head: Normocephalic and atraumatic.      Right Ear: External ear normal.      Left Ear: External ear normal.      Nose: Nose normal.      Mouth/Throat:      Mouth: Mucous membranes are moist.   Eyes:      Extraocular Movements: Extraocular movements intact.   Cardiovascular:      Rate and Rhythm: Normal rate and regular rhythm.      Pulses: Normal pulses.      Heart sounds: No murmur heard.  Pulmonary:      Effort: Pulmonary effort is normal. No respiratory distress.      Breath sounds: Normal breath sounds. No stridor. No wheezing, rhonchi or rales.   Chest:      Chest wall: No tenderness.   Abdominal:      General: Abdomen is flat. Bowel sounds are normal. There is no distension.      Palpations: Abdomen is soft.   Musculoskeletal:      Cervical back: Neck supple.      Right lower leg: No edema.      Left lower leg: No edema.   Skin:     General: Skin is warm and dry.      Capillary Refill: Capillary refill takes less than 2 seconds.   Neurological:      Mental Status: He is alert and oriented to person, place, and time.                                                                                                                                                                                                                                                                                                                                                                                                                                                                                    CARDIAC TESTING:  Echocardiogram  No results found for this or any previous visit.      Stress Test  Results for " orders placed during the hospital encounter of 11/21/23    Nuclear Stress - Cardiology Interpreted    Interpretation Summary    Abnormal myocardial perfusion scan.    There is a mild to moderate intensity, small to moderate sized, reversible perfusion abnormality that is consistent with ischemia in the mid to apical inferoapical wall(s) in the typical distribution of the RCA territory.    There are no other significant perfusion abnormalities.    The gated perfusion images showed an ejection fraction of 57% at rest. The gated perfusion images showed an ejection fraction of 51% post stress.    The patient reported no chest pain during the stress test.    There were no arrhythmias during stress.    On the nuclear stress test the EF is normal.  If the patient has an echo, the EF on the echo is considered more accurate.    The patient has a low to moderate risk nuclear stress test.    If patient is symptomatic, consider management with two anti-anginals     Coronary Angiogram  Results for orders placed during the hospital encounter of 06/02/22    Cardiac catheterization    Conclusion  · The Mid Cx stent with distal 80-90% stenosis extending into the distal circumflex and prior to the large terminal OM treated with laser atherectomy and IVUS guided PHILIP.  · The small bifurcating diagonal demonstrated an 80% stenosis of the proximal 3rd segment.  · The diffuse mid circumflex 60-70% stenosis with FLOR 3 flow throughout.  · The ejection fraction was calculated to be 65% with EDP of 10 mmHG.  · Aggrastat single bolus and Plavix 600 mg load given in cath lab.  · Right radial access  · A total of 230 cc contrast delivered     Holter Monitor  No cardiac monitor results found for the past 12 months    Last [facility]  Lab Results   Component Value Date     11/02/2023    K 4.2 11/02/2023    CO2 27 11/02/2023    BUN 18.7 11/02/2023    CREATININE 1.26 (H) 11/02/2023    CALCIUM 9.7 11/02/2023    EGFRNORACEVR 58 11/02/2023     "  Last CBC     Lab Results   Component Value Date    WBC 5.87 05/29/2023    HGB 16.4 05/29/2023    HCT 49.1 05/29/2023    MCV 89.9 05/29/2023     05/29/2023           BNP  No results found for: "BNP"  Last lipids    Lab Results   Component Value Date    CHOL 172 11/02/2023    CHOL 131 05/13/2019    HDL 27 (L) 11/02/2023    HDL 33 (L) 05/13/2019    LDL 97.00 11/02/2023    LDL 67 05/13/2019    TRIG 241 (H) 11/02/2023    TRIG 154 (H) 05/13/2019    TOTALCHOLEST 6 (H) 11/02/2023    TOTALCHOLEST 4.0 05/13/2019      LFT     Lab Results   Component Value Date    ALT 36 11/02/2023    ALT 25 05/29/2023    ALT 37 05/13/2019    AST 28 11/02/2023    AST 22 05/29/2023    AST 24 05/13/2019       Assessment:     Mr. Landen Reyes is a 78 y.o. male with CAD s/p PCI (RCA, LAD, LCx), HTN, HLD, and bendopnea who presents to clinic for follow up.     Plan:     CAD s/p PCI  -ASA 81mg daily. BB. CCB. Improved blood pressure control. PRN nitroglycerin. He walks 1 mile per day and denies angina or anginal equivalents. He has and abnormal nuclear stress test but is asymptomatic. Monitor for symptoms and titrate anti-anginal if needed.     HTN  -Not at goal.   -Atenolol 25 mg BID. Can't increase because of bradycardia but asymptomatic.   -Continue Norvasc 10 mg daily.   -Start Lisinopril 10 mg daily. RFP today and in 1 week.     HLD  -LDL is not at goal. Previously trailed on statin with abdominal cramp. Stopped statin and has symptoms. Unlikely that this is from statins. We will trial Crestor 20 mg and assess his symptoms. If he has worsened symptoms or symptoms of myalgia he reports he can no afford Repatha. I would start this and target LDL < 55.    Josef Rincon MD  Cardiology Fellow    Future Appointments   Date Time Provider Department Center   12/15/2023  9:30 AM Noemy Kenney, FNP [facility] GASTRO [place] [facility]   4/9/2024 11:00 AM Carley Santos MD [facility] CARD [place] [facility]       "

## 2023-12-07 NOTE — PATIENT INSTRUCTIONS
Follow up in 4 months  Call clinic with questions or concerns at [phone]  Start Crestor 20 mg daily for cholesterol

## 2023-12-11 ENCOUNTER — NURSE TRIAGE (OUTPATIENT)
Dept: ADMINISTRATIVE | Facility: CLINIC | Age: 78
End: 2023-12-11
Payer: MEDICARE

## 2023-12-11 ENCOUNTER — HOSPITAL ENCOUNTER (EMERGENCY)
Facility: HOSPITAL | Age: 78
Discharge: HOME | End: 2023-12-11
Attending: STUDENT IN AN ORGANIZED HEALTH CARE EDUCATION/TRAINING PROGRAM
Payer: MEDICARE

## 2023-12-11 VITALS
OXYGEN SATURATION: 98 % | WEIGHT: 210.56 LBS | HEIGHT: 66 IN | BODY MASS INDEX: 33.84 KG/M2 | HEART RATE: 59 BPM | SYSTOLIC BLOOD PRESSURE: 127 MMHG | TEMPERATURE: 98 F | DIASTOLIC BLOOD PRESSURE: 72 MMHG | RESPIRATION RATE: 20 BRPM

## 2023-12-11 DIAGNOSIS — R19.7 DIARRHEA, UNSPECIFIED TYPE: ICD-10-CM

## 2023-12-11 DIAGNOSIS — R14.0 BLOATING: Primary | ICD-10-CM

## 2023-12-11 LAB
ALBUMIN SERPL-MCNC: 4.2 G/DL (ref 3.4–4.8)
ALBUMIN/GLOB SERPL: 1.2 RATIO (ref 1.1–2)
ALP SERPL-CCNC: 96 UNIT/L (ref 40–150)
ALT SERPL-CCNC: 27 UNIT/L (ref 0–55)
APPEARANCE UR: CLEAR
AST SERPL-CCNC: 24 UNIT/L (ref 5–34)
BACTERIA #/AREA URNS AUTO: NORMAL /HPF
BASOPHILS # BLD AUTO: 0.05 X10(3)/MCL
BASOPHILS NFR BLD AUTO: 0.7 %
BILIRUB SERPL-MCNC: 0.4 MG/DL
BILIRUB UR QL STRIP.AUTO: NEGATIVE
BUN SERPL-MCNC: 19.7 MG/DL (ref 8.4–25.7)
CALCIUM SERPL-MCNC: 9.5 MG/DL (ref 8.8–10)
CHLORIDE SERPL-SCNC: 106 MMOL/L (ref 98–107)
CO2 SERPL-SCNC: 26 MMOL/L (ref 23–31)
COLOR UR AUTO: NORMAL
CREAT SERPL-MCNC: 1.34 MG/DL (ref 0.73–1.18)
EOSINOPHIL # BLD AUTO: 0.33 X10(3)/MCL (ref 0–0.9)
EOSINOPHIL NFR BLD AUTO: 4.4 %
ERYTHROCYTE [DISTWIDTH] IN BLOOD BY AUTOMATED COUNT: 12.5 % (ref 11.5–17)
GFR SERPLBLD CREATININE-BSD FMLA CKD-EPI: 54 MLS/MIN/1.73/M2
GLOBULIN SER-MCNC: 3.5 GM/DL (ref 2.4–3.5)
GLUCOSE SERPL-MCNC: 95 MG/DL (ref 82–115)
GLUCOSE UR QL STRIP.AUTO: NORMAL
HCT VFR BLD AUTO: 50.4 % (ref 42–52)
HGB BLD-MCNC: 16.5 G/DL (ref 14–18)
HOLD SPECIMEN: NORMAL
HOLD SPECIMEN: NORMAL
HYALINE CASTS #/AREA URNS LPF: NORMAL /LPF
IMM GRANULOCYTES # BLD AUTO: 0.02 X10(3)/MCL (ref 0–0.04)
IMM GRANULOCYTES NFR BLD AUTO: 0.3 %
KETONES UR QL STRIP.AUTO: NEGATIVE
LEUKOCYTE ESTERASE UR QL STRIP.AUTO: NEGATIVE
LIPASE SERPL-CCNC: 34 U/L
LYMPHOCYTES # BLD AUTO: 1.83 X10(3)/MCL (ref 0.6–4.6)
LYMPHOCYTES NFR BLD AUTO: 24.3 %
MAGNESIUM SERPL-MCNC: 2.3 MG/DL (ref 1.6–2.6)
MCH RBC QN AUTO: 29.6 PG (ref 27–31)
MCHC RBC AUTO-ENTMCNC: 32.7 G/DL (ref 33–36)
MCV RBC AUTO: 90.5 FL (ref 80–94)
MONOCYTES # BLD AUTO: 0.69 X10(3)/MCL (ref 0.1–1.3)
MONOCYTES NFR BLD AUTO: 9.2 %
NEUTROPHILS # BLD AUTO: 4.6 X10(3)/MCL (ref 2.1–9.2)
NEUTROPHILS NFR BLD AUTO: 61.1 %
NITRITE UR QL STRIP.AUTO: NEGATIVE
NRBC BLD AUTO-RTO: 0 %
PH UR STRIP.AUTO: 7 [PH]
PLATELET # BLD AUTO: 206 X10(3)/MCL (ref 130–400)
PMV BLD AUTO: 11 FL (ref 7.4–10.4)
POTASSIUM SERPL-SCNC: 4.5 MMOL/L (ref 3.5–5.1)
PROT SERPL-MCNC: 7.7 GM/DL (ref 5.8–7.6)
PROT UR QL STRIP.AUTO: NEGATIVE
RBC # BLD AUTO: 5.57 X10(6)/MCL (ref 4.7–6.1)
RBC #/AREA URNS AUTO: NORMAL /HPF
RBC UR QL AUTO: NEGATIVE
SODIUM SERPL-SCNC: 141 MMOL/L (ref 136–145)
SP GR UR STRIP.AUTO: 1.02 (ref 1–1.03)
SQUAMOUS #/AREA URNS LPF: NORMAL /HPF
UROBILINOGEN UR STRIP-ACNC: NORMAL
WBC # SPEC AUTO: 7.52 X10(3)/MCL (ref 4.5–11.5)
WBC #/AREA URNS AUTO: NORMAL /HPF

## 2023-12-11 PROCEDURE — 25500020 PHARM REV CODE 255: Performed by: STUDENT IN AN ORGANIZED HEALTH CARE EDUCATION/TRAINING PROGRAM

## 2023-12-11 PROCEDURE — 99285 EMERGENCY DEPT VISIT HI MDM: CPT | Mod: 25

## 2023-12-11 PROCEDURE — 83690 ASSAY OF LIPASE: CPT | Performed by: STUDENT IN AN ORGANIZED HEALTH CARE EDUCATION/TRAINING PROGRAM

## 2023-12-11 PROCEDURE — 80053 COMPREHEN METABOLIC PANEL: CPT | Performed by: STUDENT IN AN ORGANIZED HEALTH CARE EDUCATION/TRAINING PROGRAM

## 2023-12-11 PROCEDURE — 83735 ASSAY OF MAGNESIUM: CPT | Performed by: STUDENT IN AN ORGANIZED HEALTH CARE EDUCATION/TRAINING PROGRAM

## 2023-12-11 PROCEDURE — 81001 URINALYSIS AUTO W/SCOPE: CPT | Performed by: STUDENT IN AN ORGANIZED HEALTH CARE EDUCATION/TRAINING PROGRAM

## 2023-12-11 PROCEDURE — 85025 COMPLETE CBC W/AUTO DIFF WBC: CPT | Performed by: STUDENT IN AN ORGANIZED HEALTH CARE EDUCATION/TRAINING PROGRAM

## 2023-12-11 RX ADMIN — IOHEXOL 100 ML: 350 INJECTION, SOLUTION INTRAVENOUS at 04:12

## 2023-12-11 NOTE — TELEPHONE ENCOUNTER
"Patient states c/o dark black stool and stool changes from dark brown to black. Patient denies vomiting, dizziness and diarrhea.    Patient advised to Go to ED Now for evaluation/treatment. Patient states understanding of care advice at this time and cites no additional concerns.     Reason for Disposition   Black or tarry bowel movements  (Exception: Chronic-unchanged black-grey BMs AND is taking iron pills or Pepto-Bismol.)    Additional Information   Negative: Rectal bleeding, bloody stools, or blood in stool (bowel movement)   Black or tarry bowel movements   Negative: Shock suspected (e.g., cold/pale/clammy skin, too weak to stand, low BP, rapid pulse)   Negative: Difficult to awaken or acting confused (e.g., disoriented, slurred speech)   Negative: Passed out (i.e., lost consciousness, collapsed and was not responding)   Negative: [1] Vomiting AND [2] contains red blood or black ("coffee ground") material  (Exception: Few red streaks in vomit that only happened once.)   Negative: Sounds like a life-threatening emergency to the triager   Negative: Diarrhea is main symptom   Negative: Stool color other than brown or tan is main concern  (no bleeding and no melena)   Negative: SEVERE rectal bleeding (large blood clots; constant or on and off bleeding)   Negative: SEVERE dizziness (e.g., unable to stand, requires support to walk, feels like passing out now)   Negative: [1] MODERATE rectal bleeding (small blood clots, passing blood without stool, or toilet water turns red) AND [2] more than once a day   Negative: Pale skin (pallor) of new-onset or worsening    Protocols used: Stools - Unusual Color-A-AH, Rectal Bleeding-A-AH    "

## 2023-12-11 NOTE — ED PROVIDER NOTES
Encounter Date: 12/11/2023       History     Chief Complaint   Patient presents with    Diarrhea     Patient reporting diarrhea, intermittent hard stools; also reporting dark stools, but reports this is ongoing x 7 months.    Bloated     Patient presents to the emergency department due to abdominal pain bloating and diarrhea.  He states he was had this problem for months now, he has been taking MiraLax as needed for intermittent constipation.  States he feels like his abdomen is swollen he was having pain mostly in his epigastric region it was having intermittent dark diarrhea as well as constipation.  He denies any vomiting.  He did have a previous colonoscopy for screening purposes that was unremarkable.    The history is provided by the patient.     Review of patient's allergies indicates:   Allergen Reactions    Rosuvastatin Other (See Comments)     myalagias    Sulfur Other (See Comments)     Headaches     Past Medical History:   Diagnosis Date    Angina pectoris     Anxiety disorder, unspecified     Blockage of coronary artery of heart     Depression     Hyperlipidemia     Hypertension     Unspecified glaucoma      Past Surgical History:   Procedure Laterality Date    COLONOSCOPY W/ POLYPECTOMY      heart stents      Circumflex, LAD, RCA    LEFT HEART CATHETERIZATION Left 6/2/2022    Procedure: CATHETERIZATION, HEART, LEFT;  Surgeon: Paulie Martin MD;  Location: [facility] CATH LAB;  Service: Cardiology;  Laterality: Left;  [facility] VIA RADIAL ACCESS     Family History   Family history unknown: Yes     Social History     Tobacco Use    Smoking status: Former    Smokeless tobacco: Former   Substance Use Topics    Alcohol use: Never     Comment: ocassionally    Drug use: Never     Review of Systems   Constitutional:  Negative for chills and fever.   HENT:  Negative for congestion and sore throat.    Respiratory:  Negative for cough and shortness of breath.    Cardiovascular:  Negative for chest pain and palpitations.    Gastrointestinal:  Positive for abdominal distention, abdominal pain, constipation and diarrhea. Negative for nausea.   Genitourinary:  Negative for dysuria and hematuria.   Musculoskeletal:  Negative for arthralgias and myalgias.   Neurological:  Negative for dizziness and weakness.       Physical Exam     Initial Vitals [12/11/23 1215]   BP Pulse Resp Temp SpO2   127/72 (!) 59 16 98.1 °F (36.7 °C) 98 %      MAP       --         Physical Exam    Nursing note and vitals reviewed.  Constitutional: He appears well-developed and well-nourished.   HENT:   Head: Normocephalic and atraumatic.   Eyes: Conjunctivae are normal. Pupils are equal, round, and reactive to light.   Neck: Neck supple.   Normal range of motion.  Cardiovascular:  Normal rate and regular rhythm.           Pulmonary/Chest: Breath sounds normal. No respiratory distress.   Abdominal: Abdomen is soft. There is abdominal tenderness. There is no rebound and no guarding.   Musculoskeletal:         General: No edema. Normal range of motion.      Cervical back: Normal range of motion and neck supple.     Neurological: He is alert and oriented to person, place, and time.   Skin: Skin is warm and dry.         ED Course   Procedures  Labs Reviewed   COMPREHENSIVE METABOLIC PANEL - Abnormal; Notable for the following components:       Result Value    Creatinine 1.34 (*)     Protein Total 7.7 (*)     All other components within normal limits   CBC WITH DIFFERENTIAL - Abnormal; Notable for the following components:    MCHC 32.7 (*)     MPV 11.0 (*)     All other components within normal limits   LIPASE - Normal   MAGNESIUM - Normal   URINALYSIS, REFLEX TO URINE CULTURE - Normal   CBC W/ AUTO DIFFERENTIAL    Narrative:     The following orders were created for panel order CBC auto differential.  Procedure                               Abnormality         Status                     ---------                               -----------         ------                      CBC with Differential[[id number]]       Abnormal            Final result                 Please view results for these tests on the individual orders.   EXTRA TUBES    Narrative:     The following orders were created for panel order EXTRA TUBES.  Procedure                               Abnormality         Status                     ---------                               -----------         ------                     Light Blue Top Hold[[id number]]                             Final result               Gold Top Hold[[id number]]                                   Final result                 Please view results for these tests on the individual orders.   LIGHT BLUE TOP HOLD   GOLD TOP HOLD          Imaging Results              CT Abdomen Pelvis With IV Contrast NO Oral Contrast (Final result)  Result time 12/11/23 16:37:08      Final result by Elyse Infante MD (12/11/23 16:37:08)                   Impression:      No acute abnormality of the abdomen or pelvis.      Electronically signed by: Elyse Inafnte  Date:    12/11/2023  Time:    16:37               Narrative:    EXAMINATION:  CT ABDOMEN PELVIS WITH IV CONTRAST    CLINICAL HISTORY:  Abdominal pain, acute, nonlocalized;    TECHNIQUE:  CT imaging was performed of the abdomen and pelvis after the administration of intravenous contrast. Dose length product is 1397 mGycm. Automatic exposure control, adjustment of mA/kV or iterative reconstruction technique was used to limit radiation dose.    COMPARISON:  None    FINDINGS:  Liver: Normal.    Gallbladder and biliary tree: No calcified gallstones. No intra or extrahepatic biliary ductal dilation.    Pancreas: Normal.    Spleen: Normal.    Adrenals: Normal.    Kidneys and ureters: Right renal cysts.  No hydronephrosis.    Bladder: Distended    Reproductive organs: No pelvic masses.    Stomach/bowel: No evidence of bowel obstruction. Appendix is normal. No discernible bowel inflammation.    Lymph nodes: No  pathologically enlarged lymph node identified.    Peritoneum: No ascites or free air. No fluid collection.    Vessels: Mild scattered vascular calcifications.    Abdominal wall: Fat containing left inguinal hernia.    Lung bases: No consolidation or pleural effusion.    Bones: No acute osseous findings.                                       X-Ray Abdomen Flat And Erect (Final result)  Result time 12/11/23 14:21:09      Final result by Ced Clemons MD (12/11/23 14:21:09)                   Impression:      Nonobstructive bowel gas pattern.  Findings may be seen with mild constipation.      Electronically signed by: Ced Clemons  Date:    12/11/2023  Time:    14:21               Narrative:    EXAMINATION:  XR ABDOMEN FLAT AND ERECT    CLINICAL HISTORY:  Abdominal distension (gaseous)    TECHNIQUE:  Flat and upright imaging of the abdomen    COMPARISON:  05/29/2023    FINDINGS:  No acute osseous abnormality.  Nonobstructive bowel gas pattern.  Scattered stool noted within the colon.                                       Medications   iohexoL (OMNIPAQUE 350) injection 100 mL (100 mLs Intravenous Given 12/11/23 1626)     Medical Decision Making  Vital signs stable.  CBC, CMP lipase and urine are unremarkable.  CT abdomen pelvis without acute process, no evidence of severe constipation.  Patient has benign abdominal exam and feels fine at this point, will discharge to follow up with GI for his chronic complaints, return precautions were given.    Amount and/or Complexity of Data Reviewed  Labs: ordered. Decision-making details documented in ED Course.  Radiology: ordered. Decision-making details documented in ED Course.    Risk  Prescription drug management.                                      Clinical Impression:  Final diagnoses:  [R14.0] Bloating (Primary)  [R19.7] Diarrhea, unspecified type          ED Disposition Condition    Discharge Stable          ED Prescriptions    None       Follow-up Information        Follow up With Specialties Details Why Contact Info    [facility] - Emergency Dept Emergency Medicine Go to  If symptoms worsen [address] [place] [address] [phone]             Eligio Ureña MD  12/21/23 0605

## 2023-12-11 NOTE — DISCHARGE INSTRUCTIONS
A referral has been placed for Gastroenterology, they should call you soon to set up an appointment.

## 2023-12-15 ENCOUNTER — OFFICE VISIT (OUTPATIENT)
Dept: GASTROENTEROLOGY | Facility: CLINIC | Age: 78
End: 2023-12-15
Payer: MEDICARE

## 2023-12-15 VITALS
WEIGHT: 206.63 LBS | OXYGEN SATURATION: 98 % | TEMPERATURE: 98 F | HEART RATE: 63 BPM | BODY MASS INDEX: 32.43 KG/M2 | SYSTOLIC BLOOD PRESSURE: 137 MMHG | HEIGHT: 67 IN | DIASTOLIC BLOOD PRESSURE: 88 MMHG

## 2023-12-15 DIAGNOSIS — K59.04 CHRONIC IDIOPATHIC CONSTIPATION: Primary | ICD-10-CM

## 2023-12-15 DIAGNOSIS — K92.1 MELENA: ICD-10-CM

## 2023-12-15 DIAGNOSIS — R14.0 BLOATING SYMPTOM: ICD-10-CM

## 2023-12-15 DIAGNOSIS — K62.5 RECTAL BLEEDING: ICD-10-CM

## 2023-12-15 PROCEDURE — 99214 PR OFFICE/OUTPT VISIT, EST, LEVL IV, 30-39 MIN: ICD-10-PCS | Mod: S$PBB,,, | Performed by: NURSE PRACTITIONER

## 2023-12-15 PROCEDURE — 99215 OFFICE O/P EST HI 40 MIN: CPT | Mod: PBBFAC | Performed by: NURSE PRACTITIONER

## 2023-12-15 PROCEDURE — 99214 OFFICE O/P EST MOD 30 MIN: CPT | Mod: S$PBB,,, | Performed by: NURSE PRACTITIONER

## 2023-12-15 RX ORDER — SODIUM, POTASSIUM,MAG SULFATES 17.5-3.13G
1 SOLUTION, RECONSTITUTED, ORAL ORAL DAILY
Qty: 1 KIT | Refills: 0 | Status: SHIPPED | OUTPATIENT
Start: 2023-12-15 | End: 2023-12-17

## 2023-12-15 RX ORDER — OMEPRAZOLE 40 MG/1
40 CAPSULE, DELAYED RELEASE ORAL DAILY
Qty: 30 CAPSULE | Refills: 11 | Status: SHIPPED | OUTPATIENT
Start: 2023-12-15 | End: 2024-12-14

## 2023-12-15 NOTE — ASSESSMENT & PLAN NOTE
Laboratory results December 11, 2023 revealed unremarkable CBC; creatinine 1.34, GFR 54, and otherwise unremarkable CMP.  Abdominal x-ray December 11, 2023 revealed nonobstructive bowel gas pattern and findings consistent with mild constipation.    CT scan abdomen and pelvis with IV contrast revealed fat containing left inguinal hernia and otherwise unremarkable exam.  Recommend soluble fiber supplementation  Avoid straining or sitting on the toilet for long periods of time  Stop Glycolax and start Linzess 145 mcg daily  EGD and colonoscopy  Call with updates   Follow-up clinic visit with NP in 6 months (after date of scheduled procedures)

## 2023-12-15 NOTE — PROGRESS NOTES
Subjective:       Patient ID: Landen Reyes is a 78 y.o. male.    Chief Complaint: Constipation (Patient still having Constipation, he states that his BM is black in color. He denies any blood that he could see in the stool. He stays bloated and swollen in his stomach. He admits to taking Miralax which does not seem to be helping. He still having to strain to have a BM. He is maybe having 1 BM in a week. He feels like nothing is moving inside and there is just air. When he does have BM it hard and very solid. )    This 78-year-old  male with colon polyps, anxiety, depression, hypertension, hyperlipidemia, glaucoma, and CAD s/p stent placement presents unaccompanied for a follow-up visit.  He was initially referred for rectal bleeding and seen August 10, 2023.  He presented to the ED May 29, 2023 for further evaluation of constipation and rectal bleeding.  He reported constipation for several months and had to strain in order to have a bowel movement.  His last bowel movement was 4 days prior.  He denied abdominal pain or distention.  He reported a small amount of blood with wiping after defecation.  Upon arrival to ED, blood pressure was elevated and vital signs were otherwise stable.  Physical exam revealed unremarkable rectal exam without hemorrhoids or anal fissures noted and nontender with SHLOMO exam.  Laboratory results revealed creatinine 1.19 and otherwise unremarkable CMP and CBC.  Abdominal x-ray revealed findings consistent with constipation.  He was discharged home and prescribed Glycolax 17 g twice daily.  He was recommended outpatient follow-up with GI clinic.     He was seen August 10, 2023.  He continued to take Glycolax on an as-needed basis and reported good relief when he did take the medication.  He typically had 1 formed stool every 1-2 days and felt completely evacuated most of the time.  He had not had any further bleeding since his ED visit.  He denied melena, hematochezia, fecal  urgency, fecal incontinence, or pain with defecation.  He associated his bleeding at time of ED visit with scratching himself while wiping after defecation.  He denied abdominal pain.  His appetite was good and his weight was stable.  He denied fever, chills, nausea, vomiting, hematemesis, odynophagia, dysphagia, acid reflux, pyrosis, or early satiety.  He was not interested in having another colonoscopy at that time.    Laboratory results December 11, 2023 revealed unremarkable CBC; creatinine 1.34, GFR 54, and otherwise unremarkable CMP.    Abdominal x-ray December 11, 2023 revealed nonobstructive bowel gas pattern and findings consistent with mild constipation.      CT scan abdomen and pelvis with IV contrast revealed fat containing left inguinal hernia and otherwise unremarkable exam.    Today, he presents for a follow-up visit.  He reports persistent intermittent constipation and having 1-2 bowel movements per week with incomplete evacuation with defecation and frequent straining despite use of Glycolax daily.  He denies hematochezia, fecal urgency, fecal incontinence, or pain with defecation.  He has occasional lower abdominal pain and frequent abdominal bloating.  He denies trying anything else aside from Glycolax for constipation.  His appetite is good and his weight is stable.  He denies nocturnal symptoms, fever, chills, nausea, vomiting, hematemesis, odynophagia, dysphagia, or early satiety.  He reports dark to black stools frequently.  He does admit to taking Pepto-Bismol on occasion but has noticed some black stools on several occasions outside of times of taking Pepto-Bismol.  He has intermittent mild epigastric abdominal burning pain and is unable to identify specific triggers or relieving factors.  He has occasional symptoms of acid reflux and denies taking anything for symptomatic relief.     He had a colonoscopy done 5 years ago with Dr. Carson with findings of benign colon polyps. He takes aspirin  81 mg daily. He denies tobacco or alcohol use. He denies illicit drug use. He denies a family history of IBD, colon polyps, or colon cancer.      Review of patient's allergies indicates:   Allergen Reactions    Rosuvastatin Other (See Comments)     myalagias    Sulfur Other (See Comments)     Headaches     Past Medical History:   Diagnosis Date    Angina pectoris     Anxiety disorder, unspecified     Blockage of coronary artery of heart     Depression     Hyperlipidemia     Hypertension     Unspecified glaucoma      Past Surgical History:   Procedure Laterality Date    COLONOSCOPY W/ POLYPECTOMY      heart stents      Circumflex, LAD, RCA    LEFT HEART CATHETERIZATION Left 6/2/2022    Procedure: CATHETERIZATION, HEART, LEFT;  Surgeon: Paulie Martin MD;  Location: [facility] CATH LAB;  Service: Cardiology;  Laterality: Left;  C VIA RADIAL ACCESS     Family History:   Family history is unknown by patient.    Social History:    reports that he has quit smoking. He has quit using smokeless tobacco. He reports that he does not drink alcohol and does not use drugs.    Review of Systems  Negative except as noted in the HPI.      Objective:      Physical Exam  Constitutional:       Appearance: Normal appearance.   HENT:      Head: Normocephalic.      Mouth/Throat:      Mouth: Mucous membranes are moist.   Eyes:      Extraocular Movements: Extraocular movements intact.      Conjunctiva/sclera: Conjunctivae normal.      Pupils: Pupils are equal, round, and reactive to light.   Cardiovascular:      Rate and Rhythm: Normal rate and regular rhythm.      Pulses: Normal pulses.      Heart sounds: Normal heart sounds.   Pulmonary:      Effort: Pulmonary effort is normal.      Breath sounds: Normal breath sounds.   Abdominal:      General: Bowel sounds are normal.      Palpations: Abdomen is soft.   Musculoskeletal:         General: Normal range of motion.      Cervical back: Normal range of motion and neck supple.   Skin:      General: Skin is warm and dry.   Neurological:      General: No focal deficit present.      Mental Status: He is alert and oriented to person, place, and time.   Psychiatric:         Mood and Affect: Mood normal.         Behavior: Behavior normal.         Thought Content: Thought content normal.         Judgment: Judgment normal.         Home Medications:     Current Outpatient Medications   Medication Sig    amLODIPine (NORVASC) 10 MG tablet Take 1 tablet (10 mg total) by mouth once daily.    aspirin (ECOTRIN) 81 MG EC tablet Take 81 mg by mouth once daily.    atenoloL (TENORMIN) 50 MG tablet Take 0.5 tablets (25 mg total) by mouth 2 (two) times daily.    busPIRone (BUSPAR) 10 MG tablet Take 10 mg by mouth 3 (three) times daily.    diazePAM (VALIUM) 10 MG Tab Take 20 mg by mouth nightly.    docusate sodium (COLACE) 100 MG capsule Take 100 mg by mouth Daily.    DULoxetine (CYMBALTA) 30 MG capsule Take 30 mg by mouth 2 (two) times daily.    ezetimibe (ZETIA) 10 mg tablet Take 1 tablet (10 mg total) by mouth once daily.    finasteride (PROSCAR) 5 mg tablet Take 5 mg by mouth once daily.    lisinopriL 10 MG tablet Take 1 tablet (10 mg total) by mouth once daily.    nitroGLYCERIN (NITROSTAT) 0.4 MG SL tablet Take 0.4 mg by mouth as needed.    OXcarbazepine (TRILEPTAL) 150 MG Tab Take 150 mg by mouth 2 (two) times daily.    pilocarpine HCL 1% (PILOCAR) 1 % ophthalmic solution Place 1 drop into the right eye 2 (two) times daily.    polyethylene glycol (GLYCOLAX) 17 gram/dose powder Take 17 g by mouth once daily.    rosuvastatin (CRESTOR) 20 MG tablet Take 1 tablet (20 mg total) by mouth once daily.    sertraline (ZOLOFT) 25 MG tablet Take 25 mg by mouth Daily.    tamsulosin (FLOMAX) 0.4 mg Cap Take 1 capsule by mouth once daily.    travoprost (TRAVATAN Z) 0.004 % ophthalmic solution Place 1 drop into both eyes nightly.    zolpidem (AMBIEN) 10 mg Tab Take 1 tablet by mouth as needed.     Facility-Administered Medications  Ordered in Other Visits   Medication Frequency    sodium chloride 0.9% flush 10 mL PRN     Laboratory Results:     Recent Results (from the past 2016 hour(s))   Lipid Panel    Collection Time: 11/02/23  8:08 AM   Result Value Ref Range    Cholesterol Total 172 <=200 mg/dL    HDL Cholesterol 27 (L) 35 - 60 mg/dL    Triglyceride 241 (H) 34 - 140 mg/dL    Cholesterol/HDL Ratio 6 (H) 0 - 5    Very Low Density Lipoprotein 48     LDL Cholesterol 97.00 50.00 - 140.00 mg/dL   Comprehensive Metabolic Panel    Collection Time: 11/02/23  8:08 AM   Result Value Ref Range    Sodium Level 140 136 - 145 mmol/L    Potassium Level 4.2 3.5 - 5.1 mmol/L    Chloride 105 98 - 107 mmol/L    Carbon Dioxide 27 23 - 31 mmol/L    Glucose Level 104 82 - 115 mg/dL    Blood Urea Nitrogen 18.7 8.4 - 25.7 mg/dL    Creatinine 1.26 (H) 0.73 - 1.18 mg/dL    Calcium Level Total 9.7 8.8 - 10.0 mg/dL    Protein Total 7.4 5.8 - 7.6 gm/dL    Albumin Level 4.1 3.4 - 4.8 g/dL    Globulin 3.3 2.4 - 3.5 gm/dL    Albumin/Globulin Ratio 1.2 1.1 - 2.0 ratio    Bilirubin Total 0.7 <=1.5 mg/dL    Alkaline Phosphatase 93 40 - 150 unit/L    Alanine Aminotransferase 36 0 - 55 unit/L    Aspartate Aminotransferase 28 5 - 34 unit/L    eGFR 58 mls/min/1.73/m2   Nuclear Stress - Cardiology Interpreted    Collection Time: 11/21/23 10:54 AM   Result Value Ref Range    85% Max Predicted      Max Predicted      [facility] CV CPX PATIENT IS MALE 1.0     [facility] CV CPX PATIENT IS FEMALE 0.0     HR at rest 60 bpm    Systolic blood pressure 152 mmHg    Diastolic blood pressure 66 mmHg    RPP 9,120     Exercise duration (min) 2 minutes    Exercise duration (sec) 24 seconds    Peak HR 96 bpm    Peak Systolic  mmHg    Peak Diatolic BP 66 mmHg    Peak RPP 14,592     Estimated METs 2     % Max HR Achieved 69     Nuc Rest EF 57     Nuc Stress EF 51 %   Urinalysis, Reflex to Urine Culture    Collection Time: 12/11/23  2:06 PM    Specimen: Urine   Result Value Ref Range    Color,  UA Light-Yellow Yellow, Light-Yellow, Dark Yellow, Sherry, Straw    Appearance, UA Clear Clear    Specific Gravity, UA 1.017 1.005 - 1.030    pH, UA 7.0 5.0 - 8.5    Protein, UA Negative Negative    Glucose, UA Normal Negative, Normal    Ketones, UA Negative Negative    Blood, UA Negative Negative    Bilirubin, UA Negative Negative    Urobilinogen, UA Normal 0.2, 1.0, Normal    Nitrites, UA Negative Negative    Leukocyte Esterase, UA Negative Negative    WBC, UA 0-5 None Seen, 0-2, 3-5, 0-5 /HPF    Bacteria, UA None Seen None Seen /HPF    Squamous Epithelial Cells, UA None Seen None Seen /HPF    Hyaline Casts, UA None Seen None Seen /lpf    RBC, UA None Seen None Seen, 0-2, 3-5, 0-5 /HPF   Comprehensive metabolic panel    Collection Time: 12/11/23  2:34 PM   Result Value Ref Range    Sodium Level 141 136 - 145 mmol/L    Potassium Level 4.5 3.5 - 5.1 mmol/L    Chloride 106 98 - 107 mmol/L    Carbon Dioxide 26 23 - 31 mmol/L    Glucose Level 95 82 - 115 mg/dL    Blood Urea Nitrogen 19.7 8.4 - 25.7 mg/dL    Creatinine 1.34 (H) 0.73 - 1.18 mg/dL    Calcium Level Total 9.5 8.8 - 10.0 mg/dL    Protein Total 7.7 (H) 5.8 - 7.6 gm/dL    Albumin Level 4.2 3.4 - 4.8 g/dL    Globulin 3.5 2.4 - 3.5 gm/dL    Albumin/Globulin Ratio 1.2 1.1 - 2.0 ratio    Bilirubin Total 0.4 <=1.5 mg/dL    Alkaline Phosphatase 96 40 - 150 unit/L    Alanine Aminotransferase 27 0 - 55 unit/L    Aspartate Aminotransferase 24 5 - 34 unit/L    eGFR 54 mls/min/1.73/m2   Lipase    Collection Time: 12/11/23  2:34 PM   Result Value Ref Range    Lipase Level 34 <=60 U/L   Magnesium    Collection Time: 12/11/23  2:34 PM   Result Value Ref Range    Magnesium Level 2.30 1.60 - 2.60 mg/dL   CBC with Differential    Collection Time: 12/11/23  2:34 PM   Result Value Ref Range    WBC 7.52 4.50 - 11.50 x10(3)/mcL    RBC 5.57 4.70 - 6.10 x10(6)/mcL    Hgb 16.5 14.0 - 18.0 g/dL    Hct 50.4 42.0 - 52.0 %    MCV 90.5 80.0 - 94.0 fL    MCH 29.6 27.0 - 31.0 pg    MCHC 32.7  (L) 33.0 - 36.0 g/dL    RDW 12.5 11.5 - 17.0 %    Platelet 206 130 - 400 x10(3)/mcL    MPV 11.0 (H) 7.4 - 10.4 fL    Neut % 61.1 %    Lymph % 24.3 %    Mono % 9.2 %    Eos % 4.4 %    Basophil % 0.7 %    Lymph # 1.83 0.6 - 4.6 x10(3)/mcL    Neut # 4.60 2.1 - 9.2 x10(3)/mcL    Mono # 0.69 0.1 - 1.3 x10(3)/mcL    Eos # 0.33 0 - 0.9 x10(3)/mcL    Baso # 0.05 <=0.2 x10(3)/mcL    IG# 0.02 0 - 0.04 x10(3)/mcL    IG% 0.3 %    NRBC% 0.0 %   Light Blue Top Hold    Collection Time: 12/11/23  2:40 PM   Result Value Ref Range    Extra Tube Hold for add-ons.    Gold Top Hold    Collection Time: 12/11/23  2:40 PM   Result Value Ref Range    Extra Tube Hold for add-ons.      Imaging Results:     Narrative & Impression  EXAMINATION:  XR ABDOMEN FLAT AND ERECT     CLINICAL HISTORY:  Abdominal distension (gaseous)     TECHNIQUE:  Flat and upright imaging of the abdomen     COMPARISON:  05/29/2023     FINDINGS:  No acute osseous abnormality.  Nonobstructive bowel gas pattern.  Scattered stool noted within the colon.     Impression:     Nonobstructive bowel gas pattern.  Findings may be seen with mild constipation.     Electronically signed by: Ced Clemons  Date:                                            12/11/2023  Time:                                           14:21      Narrative & Impression  EXAMINATION:  CT ABDOMEN PELVIS WITH IV CONTRAST     CLINICAL HISTORY:  Abdominal pain, acute, nonlocalized;     TECHNIQUE:  CT imaging was performed of the abdomen and pelvis after the administration of intravenous contrast. Dose length product is 1397 mGycm. Automatic exposure control, adjustment of mA/kV or iterative reconstruction technique was used to limit radiation dose.     COMPARISON:  None     FINDINGS:  Liver: Normal.     Gallbladder and biliary tree: No calcified gallstones. No intra or extrahepatic biliary ductal dilation.     Pancreas: Normal.     Spleen: Normal.     Adrenals: Normal.     Kidneys and ureters: Right renal  cysts.  No hydronephrosis.     Bladder: Distended     Reproductive organs: No pelvic masses.     Stomach/bowel: No evidence of bowel obstruction. Appendix is normal. No discernible bowel inflammation.     Lymph nodes: No pathologically enlarged lymph node identified.     Peritoneum: No ascites or free air. No fluid collection.     Vessels: Mild scattered vascular calcifications.     Abdominal wall: Fat containing left inguinal hernia.     Lung bases: No consolidation or pleural effusion.     Bones: No acute osseous findings.     Impression:     No acute abnormality of the abdomen or pelvis.      Electronically signed by: Elyse Infante  Date:                                            12/11/2023  Time:                                           16:37    Assessment/Plan:     Problem List Items Addressed This Visit          GI    Chronic idiopathic constipation - Primary     Laboratory results December 11, 2023 revealed unremarkable CBC; creatinine 1.34, GFR 54, and otherwise unremarkable CMP.  Abdominal x-ray December 11, 2023 revealed nonobstructive bowel gas pattern and findings consistent with mild constipation.    CT scan abdomen and pelvis with IV contrast revealed fat containing left inguinal hernia and otherwise unremarkable exam.  Recommend soluble fiber supplementation  Avoid straining or sitting on the toilet for long periods of time  Stop Glycolax and start Linzess 145 mcg daily  EGD and colonoscopy  Call with updates   Follow-up clinic visit with NP in 6 months (after date of scheduled procedures)         Relevant Medications    linaCLOtide (LINZESS) 145 mcg Cap capsule    Other Relevant Orders    Case Request Endoscopy: EGD (ESOPHAGOGASTRODUODENOSCOPY), COLONOSCOPY (Completed)    Bloating symptom     See above         Relevant Medications    linaCLOtide (LINZESS) 145 mcg Cap capsule    Other Relevant Orders    Case Request Endoscopy: EGD (ESOPHAGOGASTRODUODENOSCOPY), COLONOSCOPY (Completed)    Melena      See above   GERD lifestyle modifications  Reflux precautions  Limit NSAID use  Start omeprazole 40 mg daily  EGD  Call with updates  ER precautions provided         Relevant Medications    omeprazole (PRILOSEC) 40 MG capsule    Other Relevant Orders    Case Request Endoscopy: EGD (ESOPHAGOGASTRODUODENOSCOPY), COLONOSCOPY (Completed)

## 2023-12-27 ENCOUNTER — TELEPHONE (OUTPATIENT)
Dept: GASTROENTEROLOGY | Facility: CLINIC | Age: 78
End: 2023-12-27
Payer: MEDICARE

## 2023-12-27 NOTE — TELEPHONE ENCOUNTER
----- Message from Kristen Montes sent at 12/26/2023 10:03 AM CST -----  Type:  Sooner Apoointment Request    Caller is requesting a sooner appointment.  Caller declined first available appointment listed below.  Caller will not accept being placed on the waitlist and is requesting a message be sent to doctor.  Name of Caller:pt  When is the first available appointment?7/31  Symptoms:  Double [572]  COLONOSCOPY      Would the patient rather a call back or a response via [facility]? call  Best Call Back Number:[phone]  Additional Information:

## 2024-01-02 ENCOUNTER — TELEPHONE (OUTPATIENT)
Dept: PRIMARY CARE CLINIC | Facility: CLINIC | Age: 79
End: 2024-01-02
Payer: MEDICARE

## 2024-01-02 NOTE — TELEPHONE ENCOUNTER
----- Message from Ashlie Chester sent at 12/29/2023  4:51 PM CST -----  .Type:  Patient Returning Call    Who Called:Landen  Who Left Message for Patient:pt  Does the patient know what this is regarding?:Call back  Would the patient rather a call back or a response via [facility]? SULTANA  Best Call Back Number:[phone]  Additional Information: Please call back angry about a gastro test.

## 2024-01-04 ENCOUNTER — TELEPHONE (OUTPATIENT)
Dept: PRIMARY CARE CLINIC | Facility: CLINIC | Age: 79
End: 2024-01-04
Payer: MEDICARE

## 2024-01-04 NOTE — TELEPHONE ENCOUNTER
----- Message from Ashlie Chester sent at 1/3/2024  4:54 PM CST -----  .Type:  Patient Returning Call    Who Called:Landen  Who Left Message for Patient:PT  Does the patient know what this is regarding?:Gastroenterology  Would the patient rather a call back or a response via [facility]?   Best Call Back Number:[phone]  Additional Information: Please call back about Gastroenterology referral. Patient stats he has left several message with no one calling back

## 2024-03-11 DIAGNOSIS — Z12.11 COLON CANCER SCREENING: Primary | ICD-10-CM

## 2024-03-11 RX ORDER — POLYETHYLENE GLYCOL 3350, SODIUM SULFATE, SODIUM CHLORIDE, POTASSIUM CHLORIDE, SODIUM ASCORBATE, AND ASCORBIC ACID 7.5-2.691G
KIT ORAL
Qty: 1 KIT | Refills: 0 | Status: SHIPPED | OUTPATIENT
Start: 2024-03-11

## 2024-06-04 ENCOUNTER — OFFICE VISIT (OUTPATIENT)
Dept: CARDIOLOGY | Facility: CLINIC | Age: 79
End: 2024-06-04
Payer: MEDICARE

## 2024-06-04 VITALS
BODY MASS INDEX: 30.82 KG/M2 | DIASTOLIC BLOOD PRESSURE: 64 MMHG | SYSTOLIC BLOOD PRESSURE: 114 MMHG | HEIGHT: 67 IN | HEART RATE: 55 BPM | TEMPERATURE: 98 F | OXYGEN SATURATION: 97 % | RESPIRATION RATE: 20 BRPM | WEIGHT: 196.38 LBS

## 2024-06-04 DIAGNOSIS — I25.10 CAD S/P PERCUTANEOUS CORONARY ANGIOPLASTY: Primary | ICD-10-CM

## 2024-06-04 DIAGNOSIS — Z98.61 CAD S/P PERCUTANEOUS CORONARY ANGIOPLASTY: Primary | ICD-10-CM

## 2024-06-04 DIAGNOSIS — E78.2 MIXED HYPERLIPIDEMIA: ICD-10-CM

## 2024-06-04 DIAGNOSIS — I10 PRIMARY HYPERTENSION: ICD-10-CM

## 2024-06-04 PROCEDURE — 99215 OFFICE O/P EST HI 40 MIN: CPT | Mod: PBBFAC | Performed by: INTERNAL MEDICINE

## 2024-06-04 RX ORDER — TRAZODONE HYDROCHLORIDE 50 MG/1
50-100 TABLET ORAL NIGHTLY PRN
COMMUNITY
Start: 2024-05-01

## 2024-06-04 NOTE — PATIENT INSTRUCTIONS
We will call you with date/time for your treadmill test.    REMINDER: Before your next visit with cardiology please go to 1st floor lab for fasting bloodwork.

## 2024-06-04 NOTE — PROGRESS NOTES
"  06/04/2024 12:34 PM    Subjective:     CHIEF COMPLAINT:   Chief Complaint   Patient presents with    f/u denies chest pain has LIN needs clearance for colonosco                           HPI:    Mr. Landen Reyes is a 79 y.o. male with CAD s/p PCI (RCA, LAD, LCx), HTN, HLD, and bendopnea who presents to clinic for follow up.     Here today for follow up and pre op.  He will be undergoing a C-scope and EGD.  He is chest pain free.  Previous notes report he was walking 1 mile per day.  He says he is no longer doing this or walking much at all because he "doesn't feel like it."  He is able to push a shopping cart in [facility] as well as unload groceries from his car to his apartment which is about a 100ft walk.  Denies LIN, shortness of breath.      Past Medical History    Patient Active Problem List   Diagnosis    Chronic idiopathic constipation    Hyperlipidemia    Hypertension    Coronary artery disease    Poor historian    LIN (dyspnea on exertion)    CAD S/P percutaneous coronary angioplasty    Melena    Bloating symptom       Surgical History    Past Surgical History:   Procedure Laterality Date    COLONOSCOPY W/ POLYPECTOMY      heart stents      Circumflex, LAD, RCA    LEFT HEART CATHETERIZATION Left 6/2/2022    Procedure: CATHETERIZATION, HEART, LEFT;  Surgeon: Paulie Martin MD;  Location: [facility] CATH LAB;  Service: Cardiology;  Laterality: Left;  [facility] VIA RADIAL ACCESS       Social History     Socioeconomic History    Marital status: Single   Occupational History    Occupation: Retired   Tobacco Use    Smoking status: Former    Smokeless tobacco: Former   Substance and Sexual Activity    Alcohol use: Never     Comment: ocassionally    Drug use: Never       Family History   Family history unknown: Yes     Review of patient's allergies indicates:   Allergen Reactions    Rosuvastatin Other (See Comments)     myalagias    Sulfur Other (See Comments)     Headaches       Current Medications    Current Outpatient " Medications   Medication Instructions    amLODIPine (NORVASC) 10 mg, Oral, Daily    aspirin (ECOTRIN) 81 mg, Oral, Daily    atenoloL (TENORMIN) 25 mg, Oral, 2 times daily    busPIRone (BUSPAR) 10 mg, Oral, 3 times daily    diazePAM (VALIUM) 20 mg, Oral, Nightly    docusate sodium (COLACE) 100 mg, Oral, As needed (PRN)    DULoxetine (CYMBALTA) 30 mg, Oral, 2 times daily    ezetimibe (ZETIA) 10 mg, Oral, Daily    finasteride (PROSCAR) 5 mg, Oral, Daily    linaCLOtide (LINZESS) 145 mcg, Oral, Before breakfast    lisinopriL 10 mg, Oral, Daily    nitroGLYCERIN (NITROSTAT) 0.4 mg, Oral, As needed (PRN)    omeprazole (PRILOSEC) 40 mg, Oral, Daily    OXcarbazepine (TRILEPTAL) 150 mg, Oral, 2 times daily    pilocarpine HCL 1% (PILOCAR) 1 % ophthalmic solution 1 drop, Right Eye, 2 times daily    polyethylene glycol (GLYCOLAX) 17 g, Oral, Daily    polyethylene glycol (MOVIPREP) 100-7.5-2.691 gram solution Take as directed prior to colonoscopy    rosuvastatin (CRESTOR) 20 mg, Oral, Daily    sertraline (ZOLOFT) 25 mg, Oral, Daily    tamsulosin (FLOMAX) 0.4 mg Cap 1 capsule, Oral, Daily    travoprost (TRAVATAN Z) 0.004 % ophthalmic solution 1 drop, Both Eyes, Nightly    traZODone (DESYREL)  mg, Oral, Nightly PRN    zolpidem (AMBIEN) 10 mg Tab 1 tablet, Oral, As needed (PRN)         ROS:   Denies headaches, changes in vision, nausea, vomiting, fever, chills, chest pain, palpitations, dyspnea, abdominal pain, or change in urinary or bowel habits.    Objective:     BP Readings from Last 3 Encounters:   06/04/24 114/64   12/15/23 137/88   12/11/23 127/72        Pulse Readings from Last 3 Encounters:   06/04/24 (!) 55   12/15/23 63   12/11/23 (!) 59        Temp Readings from Last 3 Encounters:   06/04/24 97.5 °F (36.4 °C) (Oral)   12/15/23 97.8 °F (36.6 °C) (Oral)   12/11/23 98.1 °F (36.7 °C) (Oral)       Wt Readings from Last 3 Encounters:   06/04/24 89.1 kg (196 lb 6.4 oz)   12/15/23 93.7 kg (206 lb 9.6 oz)   12/11/23 95.5 kg  "(210 lb 8.6 oz)         PE:  Blood pressure 114/64, pulse (!) 55, temperature 97.5 °F (36.4 °C), temperature source Oral, resp. rate 20, height 5' 7" (1.702 m), weight 89.1 kg (196 lb 6.4 oz), SpO2 97%.   Physical Exam  Vitals reviewed.   Constitutional:       General: He is not in acute distress.     Appearance: Normal appearance. He is obese. He is not ill-appearing.   HENT:      Head: Normocephalic and atraumatic.      Right Ear: External ear normal.      Left Ear: External ear normal.      Nose: Nose normal.      Mouth/Throat:      Mouth: Mucous membranes are moist.   Eyes:      Extraocular Movements: Extraocular movements intact.   Cardiovascular:      Rate and Rhythm: Normal rate and regular rhythm.      Pulses: Normal pulses.      Heart sounds: No murmur heard.  Pulmonary:      Effort: Pulmonary effort is normal. No respiratory distress.      Breath sounds: Normal breath sounds. No stridor. No wheezing, rhonchi or rales.   Chest:      Chest wall: No tenderness.   Abdominal:      General: Abdomen is flat. Bowel sounds are normal. There is no distension.      Palpations: Abdomen is soft.   Musculoskeletal:      Cervical back: Neck supple.      Right lower leg: No edema.      Left lower leg: No edema.   Skin:     General: Skin is warm and dry.      Capillary Refill: Capillary refill takes less than 2 seconds.   Neurological:      Mental Status: He is alert and oriented to person, place, and time.                                                                                                                                                                                                                                                                                                                                                                                                                                                                                    CARDIAC TESTING:  Echocardiogram  No results found for " this or any previous visit.      Stress Test  Results for orders placed during the hospital encounter of 11/21/23    Nuclear Stress - Cardiology Interpreted    Interpretation Summary    Abnormal myocardial perfusion scan.    There is a mild to moderate intensity, small to moderate sized, reversible perfusion abnormality that is consistent with ischemia in the mid to apical inferoapical wall(s) in the typical distribution of the RCA territory.    There are no other significant perfusion abnormalities.    The gated perfusion images showed an ejection fraction of 57% at rest. The gated perfusion images showed an ejection fraction of 51% post stress.    The patient reported no chest pain during the stress test.    There were no arrhythmias during stress.    On the nuclear stress test the EF is normal.  If the patient has an echo, the EF on the echo is considered more accurate.    The patient has a low to moderate risk nuclear stress test.    If patient is symptomatic, consider management with two anti-anginals     Coronary Angiogram  Results for orders placed during the hospital encounter of 06/02/22    Cardiac catheterization    Conclusion  · The Mid Cx stent with distal 80-90% stenosis extending into the distal circumflex and prior to the large terminal OM treated with laser atherectomy and IVUS guided PHILIP.  · The small bifurcating diagonal demonstrated an 80% stenosis of the proximal 3rd segment.  · The diffuse mid circumflex 60-70% stenosis with FLOR 3 flow throughout.  · The ejection fraction was calculated to be 65% with EDP of 10 mmHG.  · Aggrastat single bolus and Plavix 600 mg load given in cath lab.  · Right radial access  · A total of 230 cc contrast delivered     Holter Monitor  No cardiac monitor results found for the past 12 months    Last [facility] BMP  Lab Results   Component Value Date     12/11/2023    K 4.5 12/11/2023     12/11/2023    CO2 26 12/11/2023    BUN 19.7 12/11/2023    CREATININE 1.34  "(H) 12/11/2023    CALCIUM 9.5 12/11/2023    EGFRNORACEVR 54 12/11/2023      Last CBC     Lab Results   Component Value Date    WBC 7.52 12/11/2023    HGB 16.5 12/11/2023    HCT 50.4 12/11/2023    MCV 90.5 12/11/2023     12/11/2023           BNP  No results found for: "BNP"  Last lipids    Lab Results   Component Value Date    CHOL 172 11/02/2023    CHOL 131 05/13/2019    HDL 27 (L) 11/02/2023    HDL 33 (L) 05/13/2019    LDL 97.00 11/02/2023    LDL 67 05/13/2019    TRIG 241 (H) 11/02/2023    TRIG 154 (H) 05/13/2019    TOTALCHOLEST 6 (H) 11/02/2023    TOTALCHOLEST 4.0 05/13/2019      LFT     Lab Results   Component Value Date    ALT 27 12/11/2023    ALT 36 11/02/2023    ALT 25 05/29/2023    AST 24 12/11/2023    AST 28 11/02/2023    AST 22 05/29/2023       Assessment:     Mr. Landen Reyes is a 78 y.o. male with CAD s/p PCI (RCA, LAD, LCx), HTN, HLD, and bendopnea who presents to clinic for follow up.     Plan:     Pre-op  - RCRI is 1 (CAD)  - patient unable to perform 4METS  - given known CAD and recent abnormal stress test will proceed with a treadmill stress to assess functional capacity.  If can achieve 4 METS on treadmill without symptoms or EKG changes then would consider him low risk      CAD s/p PCI  -ASA 81mg daily. And beta blocker  - high intensity statin  - last PCI was to LCx in 2022, has residual non-obstrcutive disease in the mRCA and dLAD  - chest pain free    HTN  -at goal  -Atenolol 25 mg BID. Can't increase because of bradycardia but asymptomatic.   -Continue Norvasc 10 mg daily.   -continue Lisinopril 10 mg daily.    HLD  -LDL is not at goal 93  - restarted statin, now need to repeat lipid panel    Clayton Benedict MD  Cardiology Fellow    Future Appointments   Date Time Provider Department Center   6/4/2024  9:30 AM Carley Snatos MD [facility] DEB [place]   11/8/2024 10:30 AM Noemy Kenney FNP [facility] GASTRO [place]         "

## 2024-06-12 ENCOUNTER — LAB VISIT (OUTPATIENT)
Dept: LAB | Facility: HOSPITAL | Age: 79
End: 2024-06-12
Attending: STUDENT IN AN ORGANIZED HEALTH CARE EDUCATION/TRAINING PROGRAM
Payer: MEDICARE

## 2024-06-12 DIAGNOSIS — I10 PRIMARY HYPERTENSION: ICD-10-CM

## 2024-06-12 DIAGNOSIS — I25.10 CORONARY ARTERY DISEASE, UNSPECIFIED VESSEL OR LESION TYPE, UNSPECIFIED WHETHER ANGINA PRESENT, UNSPECIFIED WHETHER NATIVE OR TRANSPLANTED HEART: ICD-10-CM

## 2024-06-12 DIAGNOSIS — E78.2 MIXED HYPERLIPIDEMIA: ICD-10-CM

## 2024-06-12 LAB
ALBUMIN SERPL-MCNC: 4.2 G/DL (ref 3.4–4.8)
ALBUMIN/GLOB SERPL: 1.4 RATIO (ref 1.1–2)
ALP SERPL-CCNC: 88 UNIT/L (ref 40–150)
ALT SERPL-CCNC: 44 UNIT/L (ref 0–55)
ANION GAP SERPL CALC-SCNC: 6 MEQ/L
AST SERPL-CCNC: 37 UNIT/L (ref 5–34)
BILIRUB SERPL-MCNC: 0.4 MG/DL
BUN SERPL-MCNC: 24.5 MG/DL (ref 8.4–25.7)
CALCIUM SERPL-MCNC: 9.9 MG/DL (ref 8.8–10)
CHLORIDE SERPL-SCNC: 107 MMOL/L (ref 98–107)
CHOLEST SERPL-MCNC: 87 MG/DL
CHOLEST/HDLC SERPL: 3 {RATIO} (ref 0–5)
CO2 SERPL-SCNC: 28 MMOL/L (ref 23–31)
CREAT SERPL-MCNC: 1.39 MG/DL (ref 0.73–1.18)
CREAT/UREA NIT SERPL: 18
GFR SERPLBLD CREATININE-BSD FMLA CKD-EPI: 51 ML/MIN/1.73/M2
GLOBULIN SER-MCNC: 3.1 GM/DL (ref 2.4–3.5)
GLUCOSE SERPL-MCNC: 106 MG/DL (ref 82–115)
HDLC SERPL-MCNC: 31 MG/DL (ref 35–60)
LDLC SERPL CALC-MCNC: 32 MG/DL (ref 50–140)
PHOSPHATE SERPL-MCNC: 2.8 MG/DL (ref 2.3–4.7)
POTASSIUM SERPL-SCNC: 5.3 MMOL/L (ref 3.5–5.1)
PROT SERPL-MCNC: 7.3 GM/DL (ref 5.8–7.6)
SODIUM SERPL-SCNC: 141 MMOL/L (ref 136–145)
TRIGL SERPL-MCNC: 121 MG/DL (ref 34–140)
VLDLC SERPL CALC-MCNC: 24 MG/DL

## 2024-06-12 PROCEDURE — 84100 ASSAY OF PHOSPHORUS: CPT

## 2024-06-12 PROCEDURE — 36415 COLL VENOUS BLD VENIPUNCTURE: CPT

## 2024-06-12 PROCEDURE — 80053 COMPREHEN METABOLIC PANEL: CPT

## 2024-06-12 PROCEDURE — 80061 LIPID PANEL: CPT

## 2024-06-13 ENCOUNTER — HOSPITAL ENCOUNTER (OUTPATIENT)
Dept: CARDIOLOGY | Facility: HOSPITAL | Age: 79
Discharge: HOME | End: 2024-06-13
Attending: INTERNAL MEDICINE
Payer: MEDICARE

## 2024-06-13 VITALS
SYSTOLIC BLOOD PRESSURE: 122 MMHG | BODY MASS INDEX: 30.83 KG/M2 | RESPIRATION RATE: 20 BRPM | WEIGHT: 196.44 LBS | HEART RATE: 54 BPM | DIASTOLIC BLOOD PRESSURE: 58 MMHG | HEIGHT: 67 IN

## 2024-06-13 DIAGNOSIS — I25.10 CAD S/P PERCUTANEOUS CORONARY ANGIOPLASTY: ICD-10-CM

## 2024-06-13 DIAGNOSIS — Z98.61 CAD S/P PERCUTANEOUS CORONARY ANGIOPLASTY: ICD-10-CM

## 2024-06-13 PROCEDURE — 93017 CV STRESS TEST TRACING ONLY: CPT

## 2024-06-14 ENCOUNTER — DOCUMENTATION ONLY (OUTPATIENT)
Dept: CARDIOLOGY | Facility: HOSPITAL | Age: 79
End: 2024-06-14
Payer: MEDICARE

## 2024-06-14 LAB
CV STRESS BASE HR: 53 BPM
DIASTOLIC BLOOD PRESSURE: 58 MMHG
OHS CV CPX 85 PERCENT MAX PREDICTED HEART RATE MALE: 118
OHS CV CPX ESTIMATED METS: 7
OHS CV CPX MAX PREDICTED HEART RATE: 139
OHS CV CPX PATIENT IS FEMALE: 0
OHS CV CPX PATIENT IS MALE: 1
OHS CV CPX PEAK DIASTOLIC BLOOD PRESSURE: 59 MMHG
OHS CV CPX PEAK HEAR RATE: 104 BPM
OHS CV CPX PEAK RATE PRESSURE PRODUCT: NORMAL
OHS CV CPX PEAK SYSTOLIC BLOOD PRESSURE: 157 MMHG
OHS CV CPX PERCENT MAX PREDICTED HEART RATE ACHIEVED: 75
OHS CV CPX RATE PRESSURE PRODUCT PRESENTING: 6466
OHS QRS DURATION: 168 MS
OHS QTC CALCULATION: 453 MS
STRESS ECHO POST EXERCISE DUR MIN: 6 MINUTES
STRESS ECHO POST EXERCISE DUR SEC: 0 SECONDS
SYSTOLIC BLOOD PRESSURE: 122 MMHG

## 2024-06-14 NOTE — PROGRESS NOTES
Pre-procedure cardiovascular risk assessment    Pt needs to undergo colonoscopy and EGD. An exercise treadmill test was obtained to assess pt's functional status. He was able to achieve 7 METs with no CV symptoms. EKG uninterpretable due to baseline LBBB.    RCRI 1, pt is at low to moderate CV risk for a low risk procedure.  Ideally, pt needs to remain on aspirin perioperatively, if feasible. Stopping aspirin can increase cardiovascular risk. Recommend not stopping beta blocker liliya-procedure.     Carley Santos MD  Cardiology staff

## 2024-06-16 NOTE — ASSESSMENT & PLAN NOTE
[facility] Sharan  See above   GERD lifestyle modifications  Reflux precautions  Limit NSAID use  Start omeprazole 40 mg daily  EGD  Call with updates  ER precautions provided

## 2024-06-19 ENCOUNTER — TELEPHONE (OUTPATIENT)
Dept: CARDIOLOGY | Facility: CLINIC | Age: 79
End: 2024-06-19
Payer: MEDICARE

## 2024-06-19 NOTE — TELEPHONE ENCOUNTER
Patient called back stating he needs his stress results for colonoscopy. States he cannot wait until 10/24 apt to get results b/c he needs to have colonoscopy done before October. Pt's LOV was 06/04/24 and stress test completed.

## 2024-06-20 ENCOUNTER — TELEPHONE (OUTPATIENT)
Dept: GASTROENTEROLOGY | Facility: CLINIC | Age: 79
End: 2024-06-20
Payer: MEDICARE

## 2024-06-20 ENCOUNTER — ANESTHESIA EVENT (OUTPATIENT)
Dept: ENDOSCOPY | Facility: HOSPITAL | Age: 79
End: 2024-06-20
Payer: MEDICARE

## 2024-06-20 NOTE — ANESTHESIA PREPROCEDURE EVALUATION
06/20/2024  Landen Reyes is a 79 y.o., male with PMHx of obesity, CAD/stents, HTN, HLD, GERD, CKD, anxiety/depression presents for EGD/colonoscopy secondary to melena.    Atenolol--last dose  Aspirin--last dose (needs to continue)    Active Ambulatory Problems     Diagnosis Date Noted    Chronic idiopathic constipation 08/10/2023    Hyperlipidemia 10/17/2023    Hypertension 10/17/2023    Coronary artery disease 10/17/2023    Poor historian 10/17/2023    LIN (dyspnea on exertion) 11/06/2023    CAD S/P percutaneous coronary angioplasty 11/06/2023    Melena 12/15/2023    Bloating symptom 12/15/2023     Resolved Ambulatory Problems     Diagnosis Date Noted    Rectal bleeding 08/10/2023     Past Medical History:   Diagnosis Date    Angina pectoris     Anxiety disorder, unspecified     Blockage of coronary artery of heart     Depression     Unspecified glaucoma        Pre-op Assessment    I have reviewed the Patient Summary Reports.     I have reviewed the Nursing Notes. I have reviewed the NPO Status.   I have reviewed the Medications.     Review of Systems  Anesthesia Hx:  No problems with previous Anesthesia   History of prior surgery of interest to airway management or planning:          Denies Family Hx of Anesthesia complications.    Denies Personal Hx of Anesthesia complications.                    Hematology/Oncology:  Hematology Normal   Oncology Normal                                   EENT/Dental:  EENT/Dental Normal           Cardiovascular:  Cardiovascular Normal                                            Pulmonary:  Pulmonary Normal                       Renal/:  Renal/ Normal                 Hepatic/GI:  Hepatic/GI Normal                 Musculoskeletal:  Musculoskeletal Normal                Neurological:  Neurology Normal                                      Endocrine:  Endocrine Normal             Dermatological:  Skin Normal    Psych:  Psychiatric Normal                    Physical Exam  General: Alert    Airway:  Mallampati: I / I  Mouth Opening: Normal  TM Distance: Normal  Tongue: Normal  Neck ROM: Normal ROM    Dental:  Intact      Lab Results   Component Value Date    WBC 7.52 12/11/2023    HGB 16.5 12/11/2023    HCT 50.4 12/11/2023    MCV 90.5 12/11/2023     12/11/2023       CMP  Sodium   Date Value Ref Range Status   06/12/2024 141 136 - 145 mmol/L Final     Potassium   Date Value Ref Range Status   06/12/2024 5.3 (H) 3.5 - 5.1 mmol/L Final     Chloride   Date Value Ref Range Status   06/12/2024 107 98 - 107 mmol/L Final     CO2   Date Value Ref Range Status   06/12/2024 28 23 - 31 mmol/L Final     Blood Urea Nitrogen   Date Value Ref Range Status   06/12/2024 24.5 8.4 - 25.7 mg/dL Final     Creatinine   Date Value Ref Range Status   06/12/2024 1.39 (H) 0.73 - 1.18 mg/dL Final     Calcium   Date Value Ref Range Status   06/12/2024 9.9 8.8 - 10.0 mg/dL Final     Albumin   Date Value Ref Range Status   06/12/2024 4.2 3.4 - 4.8 g/dL Final     Bilirubin Total   Date Value Ref Range Status   06/12/2024 0.4 <=1.5 mg/dL Final     ALP   Date Value Ref Range Status   06/12/2024 88 40 - 150 unit/L Final     AST   Date Value Ref Range Status   06/12/2024 37 (H) 5 - 34 unit/L Final     ALT   Date Value Ref Range Status   06/12/2024 44 0 - 55 unit/L Final     eGFR   Date Value Ref Range Status   06/12/2024 51 mL/min/1.73/m2 Final           CARDS OV 6/4/24          Anesthesia Plan  Type of Anesthesia, risks & benefits discussed:    Anesthesia Type: Gen Natural Airway  Intra-op Monitoring Plan: Standard ASA Monitors  Post Op Pain Control Plan: IV/PO Opioids PRN  (medical reason for not using multimodal pain management)  Induction:  IV  Informed Consent: Informed consent signed with the Patient and all parties understand the risks and agree with anesthesia plan.  All questions answered. Patient consented  to blood products? No  ASA Score: 4  Day of Surgery Review of History & Physical: H&P Update referred to the surgeon/provider.    Ready For Surgery From Anesthesia Perspective.     .

## 2024-06-20 NOTE — TELEPHONE ENCOUNTER
----- Message from Radha Jon sent at 6/19/2024  8:17 AM CDT -----  Regarding: Pt advise / Colonoscopy  Contact: Pt  Patient  calling regarding results for Echo    Please advise.    Phone [phone]    Thank You

## 2024-06-23 ENCOUNTER — NURSE TRIAGE (OUTPATIENT)
Dept: ADMINISTRATIVE | Facility: CLINIC | Age: 79
End: 2024-06-23
Payer: MEDICARE

## 2024-06-24 ENCOUNTER — HOSPITAL ENCOUNTER (OUTPATIENT)
Facility: HOSPITAL | Age: 79
Discharge: HOME | End: 2024-06-24
Attending: INTERNAL MEDICINE | Admitting: INTERNAL MEDICINE
Payer: MEDICARE

## 2024-06-24 ENCOUNTER — ANESTHESIA (OUTPATIENT)
Dept: ENDOSCOPY | Facility: HOSPITAL | Age: 79
End: 2024-06-24
Payer: MEDICARE

## 2024-06-24 VITALS
SYSTOLIC BLOOD PRESSURE: 141 MMHG | BODY MASS INDEX: 30.53 KG/M2 | HEIGHT: 66 IN | HEART RATE: 55 BPM | OXYGEN SATURATION: 97 % | WEIGHT: 190 LBS | DIASTOLIC BLOOD PRESSURE: 71 MMHG | TEMPERATURE: 98 F | RESPIRATION RATE: 18 BRPM

## 2024-06-24 DIAGNOSIS — R14.0 BLOATING SYMPTOM: ICD-10-CM

## 2024-06-24 DIAGNOSIS — K59.04 CHRONIC IDIOPATHIC CONSTIPATION: ICD-10-CM

## 2024-06-24 DIAGNOSIS — K92.1 MELENA: ICD-10-CM

## 2024-06-24 PROCEDURE — 25000003 PHARM REV CODE 250: Performed by: NURSE ANESTHETIST, CERTIFIED REGISTERED

## 2024-06-24 PROCEDURE — 45385 COLONOSCOPY W/LESION REMOVAL: CPT | Performed by: INTERNAL MEDICINE

## 2024-06-24 PROCEDURE — 88305 TISSUE EXAM BY PATHOLOGIST: CPT | Mod: TC | Performed by: INTERNAL MEDICINE

## 2024-06-24 PROCEDURE — 27201423 OPTIME MED/SURG SUP & DEVICES STERILE SUPPLY: Performed by: INTERNAL MEDICINE

## 2024-06-24 PROCEDURE — 43235 EGD DIAGNOSTIC BRUSH WASH: CPT | Performed by: INTERNAL MEDICINE

## 2024-06-24 PROCEDURE — 37000009 HC ANESTHESIA EA ADD 15 MINS: Performed by: INTERNAL MEDICINE

## 2024-06-24 PROCEDURE — 63600175 PHARM REV CODE 636 W HCPCS: Performed by: NURSE ANESTHETIST, CERTIFIED REGISTERED

## 2024-06-24 PROCEDURE — 63600175 PHARM REV CODE 636 W HCPCS: Performed by: ANESTHESIOLOGY

## 2024-06-24 PROCEDURE — 37000008 HC ANESTHESIA 1ST 15 MINUTES: Performed by: INTERNAL MEDICINE

## 2024-06-24 PROCEDURE — D9220A PRA ANESTHESIA: Mod: ,,, | Performed by: NURSE ANESTHETIST, CERTIFIED REGISTERED

## 2024-06-24 RX ORDER — LIDOCAINE HYDROCHLORIDE 20 MG/ML
INJECTION INTRAVENOUS
Status: DISCONTINUED | OUTPATIENT
Start: 2024-06-24 | End: 2024-06-24

## 2024-06-24 RX ORDER — PROPOFOL 10 MG/ML
VIAL (ML) INTRAVENOUS
Status: DISCONTINUED | OUTPATIENT
Start: 2024-06-24 | End: 2024-06-24

## 2024-06-24 RX ORDER — SODIUM CHLORIDE, SODIUM LACTATE, POTASSIUM CHLORIDE, CALCIUM CHLORIDE 600; 310; 30; 20 MG/100ML; MG/100ML; MG/100ML; MG/100ML
INJECTION, SOLUTION INTRAVENOUS CONTINUOUS
Status: DISCONTINUED | OUTPATIENT
Start: 2024-06-24 | End: 2024-06-24 | Stop reason: HOSPADM

## 2024-06-24 RX ORDER — LIDOCAINE HYDROCHLORIDE 10 MG/ML
1 INJECTION, SOLUTION EPIDURAL; INFILTRATION; INTRACAUDAL; PERINEURAL ONCE
Status: DISCONTINUED | OUTPATIENT
Start: 2024-06-24 | End: 2024-06-24 | Stop reason: HOSPADM

## 2024-06-24 RX ADMIN — PROPOFOL 30 MG: 10 INJECTION, EMULSION INTRAVENOUS at 12:06

## 2024-06-24 RX ADMIN — LIDOCAINE HYDROCHLORIDE 50 MG: 20 INJECTION INTRAVENOUS at 12:06

## 2024-06-24 RX ADMIN — SODIUM CHLORIDE, POTASSIUM CHLORIDE, SODIUM LACTATE AND CALCIUM CHLORIDE: 600; 310; 30; 20 INJECTION, SOLUTION INTRAVENOUS at 12:06

## 2024-06-24 RX ADMIN — PROPOFOL 50 MG: 10 INJECTION, EMULSION INTRAVENOUS at 12:06

## 2024-06-24 RX ADMIN — SODIUM CHLORIDE, POTASSIUM CHLORIDE, SODIUM LACTATE AND CALCIUM CHLORIDE: 600; 310; 30; 20 INJECTION, SOLUTION INTRAVENOUS at 11:06

## 2024-06-24 NOTE — PROVATION PATIENT INSTRUCTIONS
Discharge Summary/Instructions after an Endoscopic Procedure  Patient Name: Landen Reyes  Patient MRN: [id number]  Patient YOB: 1945 Monday, June 24, 2024  Coral Miller MD  Dear patient,  As a result of recent federal legislation (The Federal Cures Act), you may   receive lab or pathology results from your procedure in your [facility]   account before your physician is able to contact you. Your physician or   their representative will relay the results to you with their   recommendations at their soonest availability.  Thank you,  RESTRICTIONS:  During your procedure today, you received medications for sedation.  These   medications may affect your judgment, balance and coordination.  Therefore,   for 24 hours, you have the following restrictions:   - DO NOT drive a car, operate machinery, make legal/financial decisions,   sign important papers or drink alcohol.    ACTIVITY:  Today: no heavy lifting, straining or running due to procedural   sedation/anesthesia.  The following day: return to full activity including work.  DIET:  Eat and drink normally unless instructed otherwise.     TREATMENT FOR COMMON SIDE EFFECTS:  - Mild abdominal pain, nausea, belching, bloating or excessive gas:  rest,   eat lightly and use a heating pad.  - Sore Throat: treat with throat lozenges and/or gargle with warm salt   water.  - Because air was used during the procedure, expelling large amounts of air   from your rectum or belching is normal.  - If a bowel prep was taken, you may not have a bowel movement for 1-3 days.    This is normal.  SYMPTOMS TO WATCH FOR AND REPORT TO YOUR PHYSICIAN:  1. Abdominal pain or bloating, other than gas cramps.  2. Chest pain.  3. Back pain.  4. Signs of infection such as: chills or fever occurring within 24 hours   after the procedure.  5. Rectal bleeding, which would show as bright red, maroon, or black stools.   (A tablespoon of blood from the rectum is not serious, especially  if   hemorrhoids are present.)  6. Vomiting.  7. Weakness or dizziness.  GO DIRECTLY TO THE NEAREST EMERGENCY ROOM IF YOU HAVE ANY OF THE FOLLOWING:      Difficulty breathing              Chills and/or fever over 101 F   Persistent vomiting and/or vomiting blood   Severe abdominal pain   Severe chest pain   Black, tarry stools   Bleeding- more than one tablespoon   Any other symptom or condition that you feel may need urgent attention  Your doctor recommends these additional instructions:  If any biopsies were taken, your doctors clinic will contact you in 1 to 2   weeks with any results.  - Patient has a contact number available for emergencies.  The signs and   symptoms of potential delayed complications were discussed with the   patient.  Return to normal activities tomorrow.  Written discharge   instructions were provided to the patient.   - Discharge patient to home.   - Resume previous diet.   - Continue present medications.   - Await pathology results.   - No recommendation at this time regarding repeat colonoscopy due to age.   - Use fiber, for example Citrucel, Fibercon, Konsyl or Metamucil.  For questions, problems or results please call your physician - Coral Miller MD at Work:  [phone], Work:  [phone].  [facility] , EMERGENCY ROOM PHONE NUMBER: [phone]  IF A COMPLICATION OR EMERGENCY SITUATION ARISES AND YOU ARE UNABLE TO REACH   YOUR PHYSICIAN - GO DIRECTLY TO THE EMERGENCY ROOM.  Coral Miller MD  6/24/2024 1:02:48 PM  This report has been verified and signed electronically.  Dear patient,  As a result of recent federal legislation (The Federal Cures Act), you may   receive lab or pathology results from your procedure in your [facility]   account before your physician is able to contact you. Your physician or   their representative will relay the results to you with their   recommendations at their soonest availability.  Thank you,  PROVATION

## 2024-06-24 NOTE — PROVATION PATIENT INSTRUCTIONS
Discharge Summary/Instructions after an Endoscopic Procedure  Patient Name: Landen Reyes  Patient MRN: [id number]  Patient YOB: 1945 Monday, June 24, 2024  Coral Miller MD  Dear patient,  As a result of recent federal legislation (The Federal Cures Act), you may   receive lab or pathology results from your procedure in your [facility]   account before your physician is able to contact you. Your physician or   their representative will relay the results to you with their   recommendations at their soonest availability.  Thank you,  RESTRICTIONS:  During your procedure today, you received medications for sedation.  These   medications may affect your judgment, balance and coordination.  Therefore,   for 24 hours, you have the following restrictions:   - DO NOT drive a car, operate machinery, make legal/financial decisions,   sign important papers or drink alcohol.    ACTIVITY:  Today: no heavy lifting, straining or running due to procedural   sedation/anesthesia.  The following day: return to full activity including work.  DIET:  Eat and drink normally unless instructed otherwise.     TREATMENT FOR COMMON SIDE EFFECTS:  - Mild abdominal pain, nausea, belching, bloating or excessive gas:  rest,   eat lightly and use a heating pad.  - Sore Throat: treat with throat lozenges and/or gargle with warm salt   water.  - Because air was used during the procedure, expelling large amounts of air   from your rectum or belching is normal.  - If a bowel prep was taken, you may not have a bowel movement for 1-3 days.    This is normal.  SYMPTOMS TO WATCH FOR AND REPORT TO YOUR PHYSICIAN:  1. Abdominal pain or bloating, other than gas cramps.  2. Chest pain.  3. Back pain.  4. Signs of infection such as: chills or fever occurring within 24 hours   after the procedure.  5. Rectal bleeding, which would show as bright red, maroon, or black stools.   (A tablespoon of blood from the rectum is not serious, especially  if   hemorrhoids are present.)  6. Vomiting.  7. Weakness or dizziness.  GO DIRECTLY TO THE NEAREST EMERGENCY ROOM IF YOU HAVE ANY OF THE FOLLOWING:      Difficulty breathing              Chills and/or fever over 101 F   Persistent vomiting and/or vomiting blood   Severe abdominal pain   Severe chest pain   Black, tarry stools   Bleeding- more than one tablespoon   Any other symptom or condition that you feel may need urgent attention  Your doctor recommends these additional instructions:  If any biopsies were taken, your doctors clinic will contact you in 1 to 2   weeks with any results.  - Patient has a contact number available for emergencies.  The signs and   symptoms of potential delayed complications were discussed with the   patient.  Return to normal activities tomorrow.  Written discharge   instructions were provided to the patient.   - Discharge patient to home.   - Resume previous diet.   - Continue present medications.  For questions, problems or results please call your physician - Coral Miller MD at Work:  [phone], Work:  [phone].  [facility] , EMERGENCY ROOM PHONE NUMBER: [phone]  IF A COMPLICATION OR EMERGENCY SITUATION ARISES AND YOU ARE UNABLE TO REACH   YOUR PHYSICIAN - GO DIRECTLY TO THE EMERGENCY ROOM.  Coral Miller MD  6/24/2024 1:04:21 PM  This report has been verified and signed electronically.  Dear patient,  As a result of recent federal legislation (The Federal Cures Act), you may   receive lab or pathology results from your procedure in your [facility]   account before your physician is able to contact you. Your physician or   their representative will relay the results to you with their   recommendations at their soonest availability.  Thank you,  PROVATION

## 2024-06-24 NOTE — PLAN OF CARE
Doctor bienvenu at the bedside with pt and family. Pt is alert and oriented x4. Pt is able to perform self care and dress self. Discharge instructions and post op education was discussed with pt and 2 friends at the bedside. IV was removed and catheter was intact. All questions and concerns addressed.

## 2024-06-24 NOTE — H&P
EGD and Colonoscopy History and Physical    Patient Name: Landen Reyes  MRN: [id number]  : 1945  Date of Procedure:  2024  Referring Physician: Shannan Juares MD  Primary Physician: Shannan Juares MD  Procedure Physician: Coral Miller MD, MPH     Procedure - EGD and Colonoscopy  ASA - per anesthesia  Mallampati - per anesthesia  History of Anesthesia problems - no  Family history Anesthesia problems -  no   Plan of anesthesia - General    Diagnosis:  dysphagia,  constipation  Chief Complaint: Same as above    HPI: Patient is an 79 y.o. male is here for the above.     Mr. Reyes is a 79 year old CM with colon polyps, anxiety, depression, CAD s/p stent placement here for an EGD and colonoscopy.    He was initially referred for rectal bleeding and seen August 10, 2023.  He presented to the ED May 29, 2023 for further evaluation of constipation and rectal bleeding.  He reported constipation for several months and had to strain in order to have a bowel movement.  His last bowel movement was 4 days prior.  He denied abdominal pain or distention.  He reported a small amount of blood with wiping after defecation.  Upon arrival to ED, blood pressure was elevated and vital signs were otherwise stable.  Physical exam revealed unremarkable rectal exam without hemorrhoids or anal fissures noted and nontender with SHLOMO exam.  Laboratory results revealed creatinine 1.19 and otherwise unremarkable CMP and CBC.  Abdominal x-ray revealed findings consistent with constipation.  He was discharged home and prescribed Glycolax 17 g twice daily.  He was recommended outpatient follow-up with GI clinic.     He was seen August 10, 2023.  He continued to take Glycolax on an as-needed basis and reported good relief when he did take the medication.  He typically had 1 formed stool every 1-2 days and felt completely evacuated most of the time.  He had not had any further bleeding since his ED visit.  He denied melena,  hematochezia, fecal urgency, fecal incontinence, or pain with defecation.  He associated his bleeding at time of ED visit with scratching himself while wiping after defecation.  He denied abdominal pain.  His appetite was good and his weight was stable.  He denied fever, chills, nausea, vomiting, hematemesis, odynophagia, dysphagia, acid reflux, pyrosis, or early satiety.  He was not interested in having another colonoscopy at that time.     Laboratory results December 11, 2023 revealed unremarkable CBC; creatinine 1.34, GFR 54, and otherwise unremarkable CMP.     Abdominal x-ray December 11, 2023 revealed nonobstructive bowel gas pattern and findings consistent with mild constipation.       CT scan abdomen and pelvis with IV contrast revealed fat containing left inguinal hernia and otherwise unremarkable exam.     Today, he presents for a follow-up visit.  He reports persistent intermittent constipation and having 1-2 bowel movements per week with incomplete evacuation with defecation and frequent straining despite use of Glycolax daily.  He denies hematochezia, fecal urgency, fecal incontinence, or pain with defecation.  He has occasional lower abdominal pain and frequent abdominal bloating.  He denies trying anything else aside from Glycolax for constipation.  His appetite is good and his weight is stable.  He denies nocturnal symptoms, fever, chills, nausea, vomiting, hematemesis, odynophagia, dysphagia, or early satiety.  He reports dark to black stools frequently.  He does admit to taking Pepto-Bismol on occasion but has noticed some black stools on several occasions outside of times of taking Pepto-Bismol.  He has intermittent mild epigastric abdominal burning pain and is unable to identify specific triggers or relieving factors.  He has occasional symptoms of acid reflux and denies taking anything for symptomatic relief.     He had a colonoscopy done 5 years ago with Dr. Carson with findings of benign colon  polyps. He takes aspirin 81 mg daily. He denies tobacco or alcohol use. He denies illicit drug use. He denies a family history of IBD, colon polyps, or colon cancer.       Last colonoscopy: 6 years ago  Family history: mother and sister with colon cancer  Anticoagulation: none    ROS:  Constitutional: No fevers, chills, No weight loss  CV: No chest pain  Pulm: No cough, No shortness of breath  GI: see HPI    Medical History:   Past Medical History:   Diagnosis Date    Angina pectoris     Anxiety disorder, unspecified     Blockage of coronary artery of heart     Depression     Hyperlipidemia     Hypertension     Unspecified glaucoma          Surgical History:   Past Surgical History:   Procedure Laterality Date    COLONOSCOPY W/ POLYPECTOMY      heart stents      Circumflex, LAD, RCA    LEFT HEART CATHETERIZATION Left 6/2/2022    Procedure: CATHETERIZATION, HEART, LEFT;  Surgeon: Paulie Martin MD;  Location: [facility] CATH LAB;  Service: Cardiology;  Laterality: Left;  C VIA RADIAL ACCESS       Family History:   Family History   Family history unknown: Yes   .    Social History:   Social History     Socioeconomic History    Marital status: Single   Occupational History    Occupation: Retired   Tobacco Use    Smoking status: Former    Smokeless tobacco: Former   Substance and Sexual Activity    Alcohol use: Never     Comment: ocassionally    Drug use: Never       Review of patient's allergies indicates:   Allergen Reactions    Rosuvastatin Other (See Comments)     myalagias    Sulfur Other (See Comments)     Headaches       Medications:   Medications Prior to Admission   Medication Sig Dispense Refill Last Dose    amLODIPine (NORVASC) 10 MG tablet Take 1 tablet (10 mg total) by mouth once daily. 90 tablet 1 6/23/2024    atenoloL (TENORMIN) 50 MG tablet Take 0.5 tablets (25 mg total) by mouth 2 (two) times daily. 90 tablet 1 6/23/2024    busPIRone (BUSPAR) 10 MG tablet Take 10 mg by mouth 3 (three) times daily.    6/23/2024    diazePAM (VALIUM) 10 MG Tab Take 20 mg by mouth nightly.   6/23/2024    docusate sodium (COLACE) 100 MG capsule Take 100 mg by mouth as needed.   6/23/2024    DULoxetine (CYMBALTA) 30 MG capsule Take 30 mg by mouth 2 (two) times daily.   6/23/2024    ezetimibe (ZETIA) 10 mg tablet Take 1 tablet (10 mg total) by mouth once daily. 90 tablet 3 6/23/2024    finasteride (PROSCAR) 5 mg tablet Take 5 mg by mouth once daily.   6/23/2024    linaCLOtide (LINZESS) 145 mcg Cap capsule Take 1 capsule (145 mcg total) by mouth before breakfast. 30 capsule 5 6/23/2024    lisinopriL 10 MG tablet Take 1 tablet (10 mg total) by mouth once daily. 90 tablet 3 6/23/2024    omeprazole (PRILOSEC) 40 MG capsule Take 1 capsule (40 mg total) by mouth once daily. 30 capsule 11 6/23/2024    OXcarbazepine (TRILEPTAL) 150 MG Tab Take 150 mg by mouth 2 (two) times daily.   6/23/2024    pilocarpine HCL 1% (PILOCAR) 1 % ophthalmic solution Place 1 drop into the right eye 2 (two) times daily.   6/23/2024    rosuvastatin (CRESTOR) 20 MG tablet Take 1 tablet (20 mg total) by mouth once daily. 90 tablet 3 6/23/2024    sertraline (ZOLOFT) 25 MG tablet Take 25 mg by mouth Daily.   6/23/2024    tamsulosin (FLOMAX) 0.4 mg Cap Take 1 capsule by mouth once daily.   6/23/2024    traZODone (DESYREL) 50 MG tablet Take  mg by mouth nightly as needed.   6/23/2024    zolpidem (AMBIEN) 10 mg Tab Take 1 tablet by mouth as needed.   6/23/2024    aspirin (ECOTRIN) 81 MG EC tablet Take 81 mg by mouth once daily.   6/20/2024    nitroGLYCERIN (NITROSTAT) 0.4 MG SL tablet Take 0.4 mg by mouth as needed.       polyethylene glycol (GLYCOLAX) 17 gram/dose powder Take 17 g by mouth once daily. (Patient not taking: Reported on 6/4/2024) 510 g 5     polyethylene glycol (MOVIPREP) 100-7.5-2.691 gram solution Take as directed prior to colonoscopy (Patient not taking: Reported on 6/4/2024) 1 kit 0     travoprost (TRAVATAN Z) 0.004 % ophthalmic solution Place 1  drop into both eyes nightly.            Physical Exam:    Vital Signs: There were no vitals filed for this visit.  There were no vitals taken for this visit.    General:          Well appearing in no acute distress  Lungs: Clear to auscultation bilaterally, respirations unlabored  Heart: Regular rate and rhythm, S1 and S2 normal, no obvious murmurs  Abdomen:         Soft, non-tender, bowel sounds normal, no masses, no organomegaly        Labs:  Lab Results   Component Value Date    WBC 7.52 12/11/2023    HGB 16.5 12/11/2023    HCT 50.4 12/11/2023    MCV 90.5 12/11/2023     12/11/2023     Lab Results   Component Value Date    INR 0.9 05/29/2023     Lab Results   Component Value Date     06/12/2024    K 5.3 (H) 06/12/2024    CO2 28 06/12/2024    BUN 24.5 06/12/2024    CREATININE 1.39 (H) 06/12/2024    LABPROT 7.3 06/12/2024    ALBUMIN 4.2 06/12/2024    BILITOT 0.4 06/12/2024    ALKPHOS 88 06/12/2024    ALT 44 06/12/2024    AST 37 (H) 06/12/2024       Assessment and Plan:     History reviewed, vital signs satisfactory, cardiopulmonary status satisfactory.  I have explained the sedation options, risks, benefits, and alternatives of this endoscopic procedure to the patient including but not limited to bleeding, inflammation, infection, perforation, and death.  All questions were answered and the patient consented to proceed with procedure as planned.   The patient is deemed an appropriate candidate for the sedation as planned.      Coral Miller MD, MPH   of Clinical Medicine  Gastroenterology and Hepatology  [facility]    6/24/2024  10:45 AM

## 2024-06-24 NOTE — TELEPHONE ENCOUNTER
Pt called and asking about arrival time for surgery in the am. Pt told to arrive 9:45-10. Pts surgery set for 11 at this time. Pt said that he didn't get a call but wanted them to know that he was definitely coming. Pt told to call back if any other questions or concerns              Reason for Disposition   Health information question, no triage required and triager able to answer question    Protocols used: Information Only Call - No Triage-A-

## 2024-06-24 NOTE — PLAN OF CARE
PT ARRIVED TO ROOM VIA STRETCHER. PT IS FULLY AWAKE, ALERT, AND ORIENTED X4. PT FOLLOWS COMMANDS AND MOVES ALL EXTREMITIES. FRIENDS ARE AT THE BEDSIDE. VITALS WNL AND ON ROOM AIR. PT IS TOLERATING ORAL FLUIDS. IV SITE IS CLEAN, DRY AND INTACT.

## 2024-06-24 NOTE — ANESTHESIA POSTPROCEDURE EVALUATION
Anesthesia Post Evaluation    Patient: Landen Reyes    Procedure(s) Performed: Procedure(s) (LRB):  EGD (N/A)  COLONOSCOPY (N/A)    Final Anesthesia Type: general      Patient location during evaluation: GI PACU  Patient participation: Yes- Able to Participate  Level of consciousness: awake and alert  Pain management: adequate  Airway patency: patent      Anesthetic complications: no      Cardiovascular status: hemodynamically stable  Respiratory status: unassisted, room air and spontaneous ventilation  Hydration status: euvolemic  Follow-up not needed.                  No case tracking events are documented in the log.      Pain/Blayne Score: 9

## 2024-06-24 NOTE — TRANSFER OF CARE
Anesthesia Transfer of Care Note    Patient: Landen Reyes    Procedure(s) Performed: Procedure(s) (LRB):  EGD (N/A)  COLONOSCOPY (N/A)    Patient location: GI    Anesthesia Type: general    Post pain: adequate analgesia    Post assessment: no apparent anesthetic complications    Post vital signs: stable    Level of consciousness: awake    Nausea/Vomiting: no nausea/vomiting    Complications: none    Transfer of care protocol was followed      Last vitals:

## 2024-06-26 LAB
ESTROGEN SERPL-MCNC: NORMAL PG/ML
INSULIN SERPL-ACNC: NORMAL U[IU]/ML
LAB AP CLINICAL INFORMATION: NORMAL
LAB AP GROSS DESCRIPTION: NORMAL
LAB AP REPORT FOOTNOTES: NORMAL
T3RU NFR SERPL: NORMAL %

## 2024-08-21 DIAGNOSIS — I25.10 CAD S/P PERCUTANEOUS CORONARY ANGIOPLASTY: ICD-10-CM

## 2024-08-21 DIAGNOSIS — Z98.61 CAD S/P PERCUTANEOUS CORONARY ANGIOPLASTY: ICD-10-CM

## 2024-08-22 RX ORDER — ATENOLOL 50 MG/1
25 TABLET ORAL 2 TIMES DAILY
Qty: 90 TABLET | Refills: 0 | Status: SHIPPED | OUTPATIENT
Start: 2024-08-22

## 2024-10-16 DIAGNOSIS — Z98.61 CAD S/P PERCUTANEOUS CORONARY ANGIOPLASTY: ICD-10-CM

## 2024-10-16 DIAGNOSIS — I25.10 CAD S/P PERCUTANEOUS CORONARY ANGIOPLASTY: ICD-10-CM

## 2024-10-17 RX ORDER — ATENOLOL 50 MG/1
25 TABLET ORAL 2 TIMES DAILY
Qty: 90 TABLET | Refills: 3 | Status: SHIPPED | OUTPATIENT
Start: 2024-10-17

## 2024-11-19 DIAGNOSIS — Z98.61 CAD S/P PERCUTANEOUS CORONARY ANGIOPLASTY: ICD-10-CM

## 2024-11-19 DIAGNOSIS — I25.10 CAD S/P PERCUTANEOUS CORONARY ANGIOPLASTY: ICD-10-CM

## 2024-11-19 RX ORDER — AMLODIPINE BESYLATE 10 MG/1
10 TABLET ORAL
Qty: 90 TABLET | Refills: 2 | Status: SHIPPED | OUTPATIENT
Start: 2024-11-19

## 2024-12-17 ENCOUNTER — OFFICE VISIT (OUTPATIENT)
Dept: CARDIOLOGY | Facility: CLINIC | Age: 79
End: 2024-12-17
Payer: MEDICARE

## 2024-12-17 VITALS
OXYGEN SATURATION: 98 % | HEIGHT: 66 IN | SYSTOLIC BLOOD PRESSURE: 135 MMHG | TEMPERATURE: 98 F | DIASTOLIC BLOOD PRESSURE: 69 MMHG | HEART RATE: 57 BPM | BODY MASS INDEX: 32.78 KG/M2 | RESPIRATION RATE: 20 BRPM | WEIGHT: 204 LBS

## 2024-12-17 DIAGNOSIS — I25.10 CORONARY ARTERY DISEASE, UNSPECIFIED VESSEL OR LESION TYPE, UNSPECIFIED WHETHER ANGINA PRESENT, UNSPECIFIED WHETHER NATIVE OR TRANSPLANTED HEART: ICD-10-CM

## 2024-12-17 DIAGNOSIS — I25.10 CAD S/P PERCUTANEOUS CORONARY ANGIOPLASTY: Primary | ICD-10-CM

## 2024-12-17 DIAGNOSIS — Z98.61 CAD S/P PERCUTANEOUS CORONARY ANGIOPLASTY: Primary | ICD-10-CM

## 2024-12-17 DIAGNOSIS — I10 PRIMARY HYPERTENSION: ICD-10-CM

## 2024-12-17 DIAGNOSIS — E78.2 MIXED HYPERLIPIDEMIA: ICD-10-CM

## 2024-12-17 PROBLEM — R06.09 DOE (DYSPNEA ON EXERTION): Status: RESOLVED | Noted: 2023-11-06 | Resolved: 2024-12-17

## 2024-12-17 PROCEDURE — 99215 OFFICE O/P EST HI 40 MIN: CPT | Mod: PBBFAC | Performed by: INTERNAL MEDICINE

## 2024-12-17 NOTE — PROGRESS NOTES
12/17/2024 12:34 PM    Subjective:     CHIEF COMPLAINT:   Chief Complaint   Patient presents with    f/u denies chest pain or sob since LV no questions                            HPI:    Mr. Landen Reyes is a 79 y.o. male with CAD s/p PCI (RCA, LAD, LCx), HTN, HLD, who presents to clinic for follow up.     During last visit, pt needed preoperative risk assessment prior to colonoscopy and EGD.  At that time patient reported he had not been exercising much.  A treadmill stress test was ordered to assess function status and patient was able to do 7 Mets on the treadmill with no cardiovascular complaints.  Today patient continues to do well.  He reports he is not walking as much as he used to in the past as he is feeling more lazy and gets bilateral knee pain on walking (he has had procedures in both knees in the past).  He denies orthopnea, PND, lower extremity edema, claudication.  No bleeding on aspirin.      Results for orders placed during the hospital encounter of 06/13/24    Exercise Stress - EKG    Interpretation Summary    The ECG portion of the study is uninterpretable due to left bundle branch block.    The patient reported no chest pain during the stress test.    The patient exercised for 6 minutes 0 seconds on a Eric protocol, corresponding to a functional capacity of 7METS, achieving a peak heart rate of 104 bpm, which is 74% of the age predicted maximum heart rate. The patient reported shortness of breath during the stress test. The test was stopped because the patient experienced fatigue.      Past Medical History    Patient Active Problem List   Diagnosis    Chronic idiopathic constipation    Hyperlipidemia    Hypertension    Coronary artery disease    Poor historian    LIN (dyspnea on exertion)    CAD S/P percutaneous coronary angioplasty    Melena    Bloating symptom       Surgical History    Past Surgical History:   Procedure Laterality Date    COLONOSCOPY N/A 6/24/2024    Procedure: COLONOSCOPY;   Surgeon: Coral Miller MD;  Location: [facility] ENDOSCOPY;  Service: Gastroenterology;  Laterality: N/A;    COLONOSCOPY W/ POLYPECTOMY      ESOPHAGOGASTRODUODENOSCOPY N/A 6/24/2024    Procedure: EGD;  Surgeon: Coral Miller MD;  Location: [facility] ENDOSCOPY;  Service: Gastroenterology;  Laterality: N/A;    heart stents      Circumflex, LAD, RCA    KNEE SURGERY Left     LEFT HEART CATHETERIZATION Left 06/02/2022    Procedure: CATHETERIZATION, HEART, LEFT;  Surgeon: Paulie Martin MD;  Location: [facility] CATH LAB;  Service: Cardiology;  Laterality: Left;  LHC VIA RADIAL ACCESS       Social History     Socioeconomic History    Marital status: Single   Occupational History    Occupation: Retired   Tobacco Use    Smoking status: Former    Smokeless tobacco: Former   Substance and Sexual Activity    Alcohol use: Never     Comment: ocassionally    Drug use: Never    Sexual activity: Not Currently       Family History   Problem Relation Name Age of Onset    Colon cancer Mother      Leukemia Father      Colon cancer Sister       Review of patient's allergies indicates:   Allergen Reactions    Rosuvastatin Other (See Comments)     myalagias    Sulfur Other (See Comments)     Headaches       Current Medications    Current Outpatient Medications   Medication Instructions    amLODIPine (NORVASC) 10 mg, Oral    aspirin (ECOTRIN) 81 mg, Daily    atenoloL (TENORMIN) 25 mg, Oral, 2 times daily    busPIRone (BUSPAR) 10 mg, 3 times daily    diazePAM (VALIUM) 20 mg, Nightly    docusate sodium (COLACE) 100 mg, As needed (PRN)    DULoxetine (CYMBALTA) 30 mg, 2 times daily    ezetimibe (ZETIA) 10 mg, Oral, Daily    finasteride (PROSCAR) 5 mg, Oral, Daily    lisinopriL 10 mg, Oral, Daily    nitroGLYCERIN (NITROSTAT) 0.4 mg, As needed (PRN)    omeprazole (PRILOSEC) 40 mg, Oral, Daily    OXcarbazepine (TRILEPTAL) 150 mg, Oral, 2 times daily    pilocarpine HCL 1% (PILOCAR) 1 % ophthalmic solution 1 drop, Right Eye, 2 times daily     "polyethylene glycol (GLYCOLAX) 17 g, Oral, Daily    rosuvastatin (CRESTOR) 20 mg, Oral, Daily    sertraline (ZOLOFT) 25 mg, Daily    tamsulosin (FLOMAX) 0.4 mg Cap 1 capsule, Daily    travoprost (TRAVATAN Z) 0.004 % ophthalmic solution 1 drop, Both Eyes, Nightly    traZODone (DESYREL)  mg, Oral, Nightly PRN    zolpidem (AMBIEN) 10 mg Tab 1 tablet, Oral, As needed (PRN)         ROS:   Denies headaches, changes in vision, nausea, vomiting, fever, chills, chest pain, palpitations, dyspnea, abdominal pain, or change in urinary or bowel habits.    Objective:     BP Readings from Last 3 Encounters:   12/17/24 135/69   06/24/24 (!) 141/71   06/13/24 (!) 122/58        Pulse Readings from Last 3 Encounters:   12/17/24 (!) 57   06/24/24 (!) 55   06/13/24 (!) 54        Temp Readings from Last 3 Encounters:   12/17/24 97.9 °F (36.6 °C) (Oral)   06/24/24 98.1 °F (36.7 °C) (Oral)   06/04/24 97.5 °F (36.4 °C) (Oral)       Wt Readings from Last 3 Encounters:   12/17/24 92.5 kg (204 lb)   06/24/24 86.2 kg (190 lb)   06/13/24 89.1 kg (196 lb 6.9 oz)         PE:  Blood pressure 135/69, pulse (!) 57, temperature 97.9 °F (36.6 °C), temperature source Oral, resp. rate 20, height 5' 6" (1.676 m), weight 92.5 kg (204 lb), SpO2 98%.     General: alert and oriented/no acute distress  Eye: EOMI/normal conjunctiva/no xanthelasma  HENT: normocephalic/moist oral mucosa  Neck: supple/nontender/no carotid bruit  Respiratory: lungs CTA/nonlabored respirations/BS equal/symmetrical expansion/no  chest wall tenderness  Cardiovascular: normal rate/normal rhythm/no murmur/normal peripheral perfusion/ 2+ bilateral peripheral pulses/no  edema/no JVD  Gastrointestinal: soft/nontender  Musculoskeletal: normal ROM  Integumentary: warm/dry/pink/intact  Neurologic: alert/oriented/normal sensory/no focal deficits  Psychiatric: cooperative/appropriate mood and affect/normal judgment                                                                            "                                                                                                                                                                                                                                                                                                                                                                                                      CARDIAC TESTING:  Echocardiogram  No results found for this or any previous visit.      Stress Test  Results for orders placed during the hospital encounter of 11/21/23    Nuclear Stress - Cardiology Interpreted    Interpretation Summary    Abnormal myocardial perfusion scan.    There is a mild to moderate intensity, small to moderate sized, reversible perfusion abnormality that is consistent with ischemia in the mid to apical inferoapical wall(s) in the typical distribution of the RCA territory.    There are no other significant perfusion abnormalities.    The gated perfusion images showed an ejection fraction of 57% at rest. The gated perfusion images showed an ejection fraction of 51% post stress.    The patient reported no chest pain during the stress test.    There were no arrhythmias during stress.    On the nuclear stress test the EF is normal.  If the patient has an echo, the EF on the echo is considered more accurate.    The patient has a low to moderate risk nuclear stress test.    If patient is symptomatic, consider management with two anti-anginals     Coronary Angiogram  Results for orders placed during the hospital encounter of 06/02/22    Cardiac catheterization    Conclusion  · The Mid Cx stent with distal 80-90% stenosis extending into the distal circumflex and prior to the large terminal OM treated with laser atherectomy and IVUS guided PHILIP.  · The small bifurcating diagonal demonstrated an 80% stenosis of the proximal 3rd segment.  · The diffuse mid circumflex 60-70% stenosis with FLOR 3 flow throughout.  · The  "ejection fraction was calculated to be 65% with EDP of 10 mmHG.  · Aggrastat single bolus and Plavix 600 mg load given in cath lab.  · Right radial access  · A total of 230 cc contrast delivered     Holter Monitor  No cardiac monitor results found for the past 12 months    Last BMP BMP  Lab Results   Component Value Date     06/12/2024    K 5.3 (H) 06/12/2024     06/12/2024    CO2 28 06/12/2024    BUN 24.5 06/12/2024    CREATININE 1.39 (H) 06/12/2024    CALCIUM 9.9 06/12/2024    EGFRNORACEVR 51 06/12/2024      Last CBC     Lab Results   Component Value Date    WBC 7.52 12/11/2023    HGB 16.5 12/11/2023    HCT 50.4 12/11/2023    MCV 90.5 12/11/2023     12/11/2023           BNP  No results found for: "BNP"  Last lipids    Lab Results   Component Value Date    CHOL 87 06/12/2024    CHOL 172 11/02/2023    CHOL 131 05/13/2019    HDL 31 (L) 06/12/2024    HDL 27 (L) 11/02/2023    HDL 33 (L) 05/13/2019    LDL 32.00 (L) 06/12/2024    LDL 97.00 11/02/2023    LDL 67 05/13/2019    TRIG 121 06/12/2024    TRIG 241 (H) 11/02/2023    TRIG 154 (H) 05/13/2019    TOTALCHOLEST 3 06/12/2024    TOTALCHOLEST 6 (H) 11/02/2023    TOTALCHOLEST 4.0 05/13/2019      LFT     Lab Results   Component Value Date    ALT 44 06/12/2024    ALT 27 12/11/2023    ALT 36 11/02/2023    AST 37 (H) 06/12/2024    AST 24 12/11/2023    AST 28 11/02/2023       Assessment:     Mr. Landen Reyes is a 78 y.o. male with CAD s/p PCI (RCA, LAD, LCx), HTN, HLD, and bendopnea who presents to clinic for follow up.     Plan:     CAD s/p multiple PCI  Asymptomatic  Pt was able to do 7 METs on recent treadmill stress test with no CV complaints  - ASA 81mg daily, high intensity statin, zetia and atenolol  - last PCI was to LCx in 2022, has residual non-obstrcutive disease in the mRCA and dLAD    HTN  -at goal 135/69  -Atenolol 25 mg BID.   -Continue Norvasc 10 mg daily.   -continue Lisinopril 10 mg daily.    HLD  LDL 32 in 6/2024 on lipitor 40mg and zetia " 10mg    Carley Santos MD      No future appointments.

## 2025-03-17 ENCOUNTER — PATIENT MESSAGE (OUTPATIENT)
Dept: BEHAVIORAL HEALTH | Facility: CLINIC | Age: 80
End: 2025-03-17
Payer: MEDICARE

## 2025-04-03 DIAGNOSIS — Z98.61 CAD S/P PERCUTANEOUS CORONARY ANGIOPLASTY: ICD-10-CM

## 2025-04-03 DIAGNOSIS — I25.10 CAD S/P PERCUTANEOUS CORONARY ANGIOPLASTY: ICD-10-CM

## 2025-04-03 NOTE — TELEPHONE ENCOUNTER
----- Message from Cerenity sent at 4/3/2025  3:26 PM CDT -----  Regarding: Rx refill  Pt's sister, Cherri, called to request a refill on atenoloL (TENORMIN) 50 MG tablet. Since the pt has moved to [place], he has accidentally been taking the 1 tablet of the atenoloL twice daily, instead of 1/2 of the tablet twice daily. He states he has not experienced any major symptoms from doing this. Just wanted us to be aware.Pharmacy: [facility] - [place], [place] - [phone]

## 2025-04-03 NOTE — TELEPHONE ENCOUNTER
Patient is working on getting established with a cardiology clinic there, but is not yet scheduled and is almost completely out. Wants you to send to [place] [facility], if you can't please sent to [facility]

## 2025-04-04 RX ORDER — ATENOLOL 50 MG/1
25 TABLET ORAL 2 TIMES DAILY
Qty: 90 TABLET | Refills: 3 | Status: SHIPPED | OUTPATIENT
Start: 2025-04-04

## 2025-08-21 ENCOUNTER — PATIENT MESSAGE (OUTPATIENT)
Dept: PRIMARY CARE CLINIC | Facility: CLINIC | Age: 80
End: 2025-08-21
Payer: MEDICARE

## (undated) DEVICE — CATH BLLN FG APEX MR 2.50X12MM

## (undated) DEVICE — CATH EAGLE EYE PLATINUM

## (undated) DEVICE — WIRE GUIDE SAFE-T-J .035 260CM

## (undated) DEVICE — Device

## (undated) DEVICE — KIT SURGICAL COLON .25 1.1OZ

## (undated) DEVICE — KIT GLIDESHEATH SLEND 6FR 10CM

## (undated) DEVICE — SET ANGIO ACIST CVI ANGIOTOUCH

## (undated) DEVICE — CATH JACKY RADIAL 5FR 100CM

## (undated) DEVICE — PAD HEARTSTART DEFIB ADULT

## (undated) DEVICE — BAND TR COMP DEVICE REG 24CM

## (undated) DEVICE — CATH NC QUANTUM APEX MR 2.5X15

## (undated) DEVICE — MOUTHPIECE ENDO 60FR

## (undated) DEVICE — SNARE EXACTO COLD

## (undated) DEVICE — TRAP ETRAP POLYP 50 TRAY

## (undated) DEVICE — GUIDEWIRE PILOT 50X190

## (undated) DEVICE — CATH FL 3.5 5FR

## (undated) DEVICE — CANNULA NASAL ADULT

## (undated) DEVICE — GUIDE LAUNCHER 6FR EBU 3.0

## (undated) DEVICE — MANIFOLD 4 PORT

## (undated) DEVICE — SET EXT NAMIC ARTERIAL 12IN

## (undated) DEVICE — DEVICE BASIXCOMPAK INFL 20ML

## (undated) DEVICE — VALVE CONTROL COPILOT